# Patient Record
Sex: MALE | Race: OTHER | NOT HISPANIC OR LATINO | URBAN - METROPOLITAN AREA
[De-identification: names, ages, dates, MRNs, and addresses within clinical notes are randomized per-mention and may not be internally consistent; named-entity substitution may affect disease eponyms.]

---

## 2020-09-13 ENCOUNTER — TELEPHONE ENCOUNTER (OUTPATIENT)
Dept: URBAN - METROPOLITAN AREA CLINIC 92 | Facility: CLINIC | Age: 65
End: 2020-09-13

## 2020-09-13 NOTE — HPI-TODAY'S VISIT:
This is a scheduled appointment for this patient, a 65 year old Other Race male, after a previous visit on May 2020, for a telemed follow-up evaluation of Autoimmune Hepatitis. The patient reports no symptoms at the present time. He states no history of new medications or alcohol use. The patient reports a personal history of no other habits that could cause liver damage.      Pt here for a telemed follow up in regards to his aih and cirrhosis.  last year he had ascites and had an u/s in  nov 2019 that showed trace ascites and cirrhosis, splenomegaly no lesion noted.  He spent the holidays in [place] last year.  2-4-20 Dr Melchor did egd: post variceal scar in lower esophagus. 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 single 15mm polyp in gastric antrum with adjacent scar from prior incomplete polypectomy. Removed hot snare. two 4-8mm sessile polyps in gastric bdy, Hot snare.  path: Pathologic Diagnosis STOMACH, POLYPS, POLYPECTOMY: HYPERPLASTIC POLYP (FRAGMENTS OF) WITH INFLAMMATORY CHANGE, SUPERFICIAL EROSION, GRANULATION TISSUE FORMATION AND CHANGES SUGGESTIVE OF MUCOSAL PROLAPSE.   ag Electronically Signed by Aramis Silverio MD, PhD, Pathologist Location: [facility]  Dr Melchor to redo the egd in a year.  That was first banding in a while.  1-28-20 u.s with hepatic cirrhosis and no focal mass seen and splenomegaly seen.  right kidney 12.3cm.  imaged pancreas portions unremarkable. No gallstones. cbd 3mm.  Liver irregular capsule.  Due for the u.s July 28 2020.  Last labs 3-19-20: ast 32 and alt 18 alk 71 and wbc 2.5 hg 10.2 and plat 78. afp 1.8. psa 1.1.  Suspect labs better and eating better and he will redo and do and iron.  12-13-19 inr 1.2 and glu 198 and cr 0.65 and na 137 k 4.3 and cl 104 and co2 29 and ca 8.5 and alb 3.4 and tb 0.9 and alk 77 and ast 26 and alt 16.a1c 6.4%.wbc 2.5 hg 11.5 plat 91.  he has been off diuretics and only little leg edema and he is not on this.  Walking every day abotu 5 km a day.  Prior he has seen Dr. Abrams. he needs to check the iron.  12/13/19 inr 1.2 gluc 198 na 137 cr 0.65 tb 0.9 alp 77 ast 26 alt 16 hemoglobin a1c 6.4% wbc low 2.5 neutrophils 1608 hgb 11.5 plt 91 afp 2.0    Egd 12/3/19  grade 2 varices were banded. single 20mm sessile polyp resection was incomplete due to polypectomy being technically difficult and complex. he is going to repeat an egd next month.  Document Type: US Abdomen Complete Document Date: August 23, 2019 17:33  Document Status: Auth (Verified) Document Title: US Abdomen Complete Performed By: Dequan Koch  Verified By: Dequan Koch on August 23, 2019 17:38  Encounter info: [id number], [facility], Single Visit OP, 8/23/2019 - 8/23/2019   * Final Report *  Reason For Exam Liver dz, HTN  DM   Lower extremity edema  REPORT EXAM: US Abdomen Complete  CLINICAL INDICATION: Liver disorder specified;Liver dz, HTN  DM   Lower extremity edema. DM   HTN  Liver DZ  TECHNIQUE: Axial and longitudinal images were obtained of the upper abdomen using real-time ultrasound. [id number]  COMPARISON: Remote MRI of 11/17/2015  FINDINGS:  Liver: Diffuse cirrhotic changes. No lesions. Portal vein is patent, peak velocity approximately 10 cm/s. Hepatic veins are patent.  Bile Ducts: No dilated intrahepatic biliary radicles. Common bile duct measu2.4 mm.  Gallbladder: Normal. Cheney's sign is negative.  Pancreas: Normal.  Right Kidney: Measures 11.6 cm. Normal echogenicity. No hydronephrosis. No focal lesion  Left Kidney: Measures 10.9 cm. Normal echogenicity. No hydronephrosis. No focal lesion  Spleen: Measures: 16.5 cm. Homogeneous appearance.  Aorta: Not well seen  IVC: Normal proximally, not imaged distally.   Other: Small quantities of scattered simple appearing ascitic fluid.  IMPRESSION:    Redemonstrated cirrhotic changes within the liver. No focal lesion identified. Preserved hepatopedal portal flow. Portal hypertension changes as evidenced by small quantities of ascites and splenomegaly.   Signature Line *** Final ***  Electronically Signed By:  Dequan Koch on  08/23/2019 17:38  Dictated by:  Dequan Koch  Sept 13 2019 chol 169 and hdl 61 and trg 52 and ldl 95, alb 3.5 and globulin 3.9 elevated and tb 2.2 and db 0.5 and ib 1.7 and alk 90 and ast 27 and alt 17,  wbc 3.2 hg 11.8 and plat 92 and crp 5.2.  May 2019 wbc 5 hg 8.0 and plat 144 and inr 1.2. alb 3.5 and tb 0.8 and db 0.2 and alk 65 and ast 26 and alt 15.  afp 1.6 and a1c 6.1%.   When he was low he did get the iron.  Dr. Arias for an umbilical hernia and hemorrhoidectomy which was done on 11/16/18, with re-expolration on 11/24/18 and on 12/11/18 for large rectal varices noted on colonocopy.  Prior to 2019  had been off the diuretics since July 2018.    4/24/19 labs albumin low 3.5 alt 65 ast 26 alt 15 wbc low 3.2 hgb low 7.8 mcv low 74.4 plt low 103 afp 1.6 psa 1 c-reactive protein 5.4 vit d 22 hdl low 39 hemoglobin a1c 6.1%   12/2/18 na 138 k 4.0 cr 0.73 albumin 3.3 alt 17 ast 31 alp 52 wbc low 4 hgb low 8.5 plt low 140 inr 1.48 tb 1.3 MELD 12 Sept 21 2018 a1c 6.4 and alb 4.2 and tb 0.5 and db 0.2 and alk 65 and ast 32 and alt 22 and tp 7.4, chol 160 and trg 83 and hdl 60 and ldl 83. wbc 2.2 hg 8.7 and plat 88 afp 1.9.   U/s: July 2018: Cirrhosis and no ascites and no masses.  Egd May 2018 no varices noted, gastric polyp noted and removed.  He is due for the egd with Dr Melchor. May want Dr Melchor to talk to Dr Arias re flex sig.  Egd 6/4/18 post variceal banding scar found, no varices noted, one cratered gastric ulcer with no stigmata of bleeding found in gastric antrum. duodenum was normal.   He will do the labs for me now as these are from march and we can relook and if anemic and iron DR Abrams to see and we want to see in aug u.s again.   Duration of visit mins with over 50% of the time explaining pt's condition and treatment plan with the pt.

## 2020-09-14 ENCOUNTER — OFFICE VISIT (OUTPATIENT)
Dept: URBAN - METROPOLITAN AREA TELEHEALTH 2 | Facility: TELEHEALTH | Age: 65
End: 2020-09-14
Payer: MEDICARE

## 2020-09-14 DIAGNOSIS — K63.5 COLON POLYP: ICD-10-CM

## 2020-09-14 DIAGNOSIS — I85.10 SECONDARY ESOPHAGEAL VARICES WITHOUT BLEEDING: ICD-10-CM

## 2020-09-14 DIAGNOSIS — K64.9 RECTAL VARICES: ICD-10-CM

## 2020-09-14 DIAGNOSIS — K74.69 OTHER CIRRHOSIS OF LIVER: ICD-10-CM

## 2020-09-14 DIAGNOSIS — K31.7 GASTRIC POLYP: ICD-10-CM

## 2020-09-14 PROCEDURE — G8420 CALC BMI NORM PARAMETERS: HCPCS | Performed by: INTERNAL MEDICINE

## 2020-09-14 PROCEDURE — G9903 PT SCRN TBCO ID AS NON USER: HCPCS | Performed by: INTERNAL MEDICINE

## 2020-09-14 PROCEDURE — 3017F COLORECTAL CA SCREEN DOC REV: CPT | Performed by: INTERNAL MEDICINE

## 2020-09-14 PROCEDURE — 1036F TOBACCO NON-USER: CPT | Performed by: INTERNAL MEDICINE

## 2020-09-14 PROCEDURE — G8427 DOCREV CUR MEDS BY ELIG CLIN: HCPCS | Performed by: INTERNAL MEDICINE

## 2020-09-14 PROCEDURE — 99213 OFFICE O/P EST LOW 20 MIN: CPT | Performed by: INTERNAL MEDICINE

## 2020-09-14 RX ORDER — PANTOPRAZOLE SODIUM 40 MG
TAKE 1 TABLET (40 MG) BY ORAL ROUTE ONCE DAILY TABLET, DELAYED RELEASE (ENTERIC COATED) ORAL 1
Qty: 0 | Refills: 0 | Status: ACTIVE | COMMUNITY
Start: 1900-01-01

## 2020-09-14 RX ORDER — URSODIOL 300 MG/1
TAKE 1 CAPSULE BY ORAL ROUTE TID TAKE WITH FOOD CAPSULE ORAL
Qty: 90 | Refills: 6 | Status: ACTIVE | COMMUNITY
Start: 2017-12-07

## 2020-09-14 RX ORDER — INSULIN GLARGINE 100 [IU]/ML
40 UNITS SQ INJECT BY SUBCUTANEOUS ROUTE 1X INJECTION, SOLUTION SUBCUTANEOUS 1
Qty: 1 | Refills: 0 | Status: ACTIVE | COMMUNITY
Start: 1900-01-01

## 2020-09-14 NOTE — HPI-TODAY'S VISIT:
This is a 65-year-old male who presents today for follow-up.  I last saw him back in January at the time of EGD for cirrhosis and varices surveillance.  At which time he had grade 1 varices which were banded.  He had a few gastric polyps which were resected.  His last colonoscopy was in December 2019 there was a fair prep.  He had a few polyps which were removed.  He is currently in his usual state of health and followed by Dr. Albarran for his liver.

## 2020-09-17 ENCOUNTER — OFFICE VISIT (OUTPATIENT)
Dept: URBAN - METROPOLITAN AREA TELEHEALTH 2 | Facility: TELEHEALTH | Age: 65
End: 2020-09-17

## 2020-09-17 ENCOUNTER — OFFICE VISIT (OUTPATIENT)
Dept: URBAN - METROPOLITAN AREA CLINIC 91 | Facility: CLINIC | Age: 65
End: 2020-09-17
Payer: MEDICARE

## 2020-09-17 ENCOUNTER — TELEPHONE ENCOUNTER (OUTPATIENT)
Dept: URBAN - METROPOLITAN AREA CLINIC 92 | Facility: CLINIC | Age: 65
End: 2020-09-17

## 2020-09-17 DIAGNOSIS — R16.1 SPLENOMEGALY: ICD-10-CM

## 2020-09-17 DIAGNOSIS — K74.69 OTHER CIRRHOSIS OF LIVER: ICD-10-CM

## 2020-09-17 DIAGNOSIS — R18.8 ASCITES: ICD-10-CM

## 2020-09-17 PROCEDURE — 76705 ECHO EXAM OF ABDOMEN: CPT

## 2020-09-17 PROCEDURE — 93975 VASCULAR STUDY: CPT

## 2020-09-17 RX ORDER — INSULIN GLARGINE 100 [IU]/ML
40 UNITS SQ INJECT BY SUBCUTANEOUS ROUTE 1X INJECTION, SOLUTION SUBCUTANEOUS 1
Qty: 1 | Refills: 0 | Status: ACTIVE | COMMUNITY
Start: 1900-01-01

## 2020-09-17 RX ORDER — URSODIOL 300 MG/1
TAKE 1 CAPSULE BY ORAL ROUTE TID TAKE WITH FOOD CAPSULE ORAL
Qty: 90 | Refills: 6 | Status: ACTIVE | COMMUNITY
Start: 2017-12-07

## 2020-09-17 RX ORDER — PANTOPRAZOLE SODIUM 40 MG
TAKE 1 TABLET (40 MG) BY ORAL ROUTE ONCE DAILY TABLET, DELAYED RELEASE (ENTERIC COATED) ORAL 1
Qty: 0 | Refills: 0 | Status: ACTIVE | COMMUNITY
Start: 1900-01-01

## 2020-09-17 NOTE — HPI-TODAY'S VISIT:
Dear  Dr Marte, U.s: they see signs of cirrhosis, with signs of portal hypertension. Small volume ascites and splenomegaly.  No suspicious liver lesions. Patent vessels. Spleen 16cm enlarged.  Right kidney 11.6cm. Pancreas normal. No stones in gallbladder. No liver lesions.  Dr Albarran

## 2020-09-20 ENCOUNTER — TELEPHONE ENCOUNTER (OUTPATIENT)
Dept: URBAN - METROPOLITAN AREA CLINIC 92 | Facility: CLINIC | Age: 65
End: 2020-09-20

## 2020-09-20 NOTE — HPI-TODAY'S VISIT:
Dr Marte,  Here are local labs reviewed ythat you sent in:  Sept labs: alb 3.3 and tb 0.8 and db 0.2 and alk 79 and ast 35 and alt 21 and ideal <35.  wbc 2.4 littl elow abd hg low 9.9 and mcv 85.8.  Platelts clumped. crp 2.4. afp normal 2.2 and psa 1.0. a1c 6.9%.   See you soon.  Dr Albarran

## 2020-09-22 ENCOUNTER — OFFICE VISIT (OUTPATIENT)
Dept: URBAN - METROPOLITAN AREA TELEHEALTH 2 | Facility: TELEHEALTH | Age: 65
End: 2020-09-22
Payer: MEDICARE

## 2020-09-22 DIAGNOSIS — K75.4 AUTOIMMUNE HEPATITIS: ICD-10-CM

## 2020-09-22 DIAGNOSIS — E80.4 GILBERT SYNDROME: ICD-10-CM

## 2020-09-22 DIAGNOSIS — Z71.89 VACCINE COUNSELING: ICD-10-CM

## 2020-09-22 DIAGNOSIS — D69.6 THROMBOCYTOPENIA: ICD-10-CM

## 2020-09-22 DIAGNOSIS — I85.10 SECONDARY ESOPHAGEAL VARICES WITHOUT BLEEDING: ICD-10-CM

## 2020-09-22 DIAGNOSIS — E66.3 OVERWEIGHT: ICD-10-CM

## 2020-09-22 DIAGNOSIS — R18.8 OTHER ASCITES: ICD-10-CM

## 2020-09-22 DIAGNOSIS — R16.1 SPLENOMEGALY: ICD-10-CM

## 2020-09-22 DIAGNOSIS — E11.9 DIABETES: ICD-10-CM

## 2020-09-22 PROCEDURE — 1036F TOBACCO NON-USER: CPT

## 2020-09-22 PROCEDURE — G8427 DOCREV CUR MEDS BY ELIG CLIN: HCPCS

## 2020-09-22 PROCEDURE — 3017F COLORECTAL CA SCREEN DOC REV: CPT

## 2020-09-22 PROCEDURE — 99214 OFFICE O/P EST MOD 30 MIN: CPT

## 2020-09-22 PROCEDURE — G8417 CALC BMI ABV UP PARAM F/U: HCPCS

## 2020-09-22 PROCEDURE — G9903 PT SCRN TBCO ID AS NON USER: HCPCS

## 2020-09-22 RX ORDER — URSODIOL 300 MG/1
TAKE 1 CAPSULE BY ORAL ROUTE TID TAKE WITH FOOD CAPSULE ORAL
Qty: 30 | Refills: 4

## 2020-09-22 RX ORDER — FUROSEMIDE 20 MG/1
1 TABLET TABLET ORAL TIW
Qty: 10 | Refills: 2 | OUTPATIENT

## 2020-09-22 RX ORDER — URSODIOL 300 MG/1
TAKE 1 CAPSULE BY ORAL ROUTE TID TAKE WITH FOOD CAPSULE ORAL
Qty: 90 | Refills: 6 | Status: ACTIVE | COMMUNITY
Start: 2017-12-07

## 2020-09-22 RX ORDER — INSULIN GLARGINE 100 [IU]/ML
40 UNITS SQ INJECT BY SUBCUTANEOUS ROUTE 1X INJECTION, SOLUTION SUBCUTANEOUS 1
Qty: 1 | Refills: 0 | Status: ACTIVE | COMMUNITY
Start: 1900-01-01

## 2020-09-22 RX ORDER — PANTOPRAZOLE SODIUM 40 MG
TAKE 1 TABLET (40 MG) BY ORAL ROUTE ONCE DAILY TABLET, DELAYED RELEASE (ENTERIC COATED) ORAL 1
Qty: 0 | Refills: 0 | Status: ACTIVE | COMMUNITY
Start: 1900-01-01

## 2020-09-22 NOTE — HPI-TODAY'S VISIT:
This is a scheduled appointment for this patient, a 65 year old Other Race male, after a previous visit on May 2020, for a telemed follow-up evaluation of Autoimmune Hepatitis.   The patient reports no symptoms at the present time. He states no history of new medications or alcohol use. The patient reports a personal history of no other habits that could cause liver damage.    Pt here for a telemed follow up in regards to his aih and cirrhosis.    2019 he had ascites and had an u/s in 2019 that showed trace ascites and cirrhosis, splenomegaly no lesion noted.  Not able to travel due to covid 19 and he has not been able to travel.  Sept labs: alb 3.3 and tb 0.8 and db 0.2 and alk 79 and ast 35 and alt 21 and ideal  less than 35.  wbc 2.4 little low and hg low 9.9 and mcv 85.8.  Platelts clumped. crp 2.4. afp normal 2.2 and psa 1.0. a1c 6.9%.   May want to redo the sample and do the iron and he says he redid it and pending and he suspects that it is real. Hg was better 10.7 so little low.   2020: U.s: they see signs of cirrhosis, with signs of portal hypertension. Small volume ascites and splenomegaly.  No suspicious liver lesions. Patent vessels. Spleen 16cm enlarged.  Right kidney 11.6cm. Pancreas normal. No stones in gallbladder. No liver lesions.   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 single 15mm polyp in gastric antrum with adjacent scar from prior incomplete polypectomy. Removed hot snare. two 4-8mm sessile polyps in gastric bdy, Hot snare.  he has not had one since and to do Dec 16 2020 with Dr Melchor and he will do colon also.  His last colonoscopy was in 2019 there was a fair prep.  He had a few polyps which were removed.    path: Pathologic Diagnosis STOMACH, POLYPS, POLYPECTOMY: HYPERPLASTIC POLYP (FRAGMENTS OF) WITH INFLAMMATORY CHANGE, SUPERFICIAL EROSION, GRANULATION TISSUE FORMATION AND CHANGES SUGGESTIVE OF MUCOSAL PROLAPSE.   ag Electronically Signed by Aramis Silverio MD, PhD, Pathologist Location: [facility]  20 u.s with hepatic cirrhosis and no focal mass seen and splenomegaly seen.  right kidney 12.3cm.  imaged pancreas portions unremarkable. No gallstones. cbd 3mm.  Liver irregular capsule.   3-19-20: ast 32 and alt 18 alk 71 and wbc 2.5 hg 10.2 and plat 78. afp 1.8. psa 1.1.  19 inr 1.2 and glu 198 and cr 0.65 and na 137 k 4.3 and cl 104 and co2 29 and ca 8.5 and alb 3.4 and tb 0.9 and alk 77 and ast 26 and alt 16.a1c 6.4%.wbc 2.5 hg 11.5 plat 91.  Walking every day about 5 km a day approx.  Prior he has seen Dr. Abrams. may want to check iron and do that and if low do the iron.  19 inr 1.2 gluc 198 na 137 cr 0.65 tb 0.9 alp 77 ast 26 alt 16 hemoglobin a1c 6.4% wbc low 2.5 neutrophils 1608 hgb 11.5 plt 91 afp 2.0    Egd 12/3/19  grade 2 varices were banded. single 20mm sessile polyp resection was incomplete due to polypectomy being technically difficult and complex.   Document Type: US Abdomen Complete Document Date: 2019 17:33  Document Status: Auth (Verified) Document Title: US Abdomen Complete Performed By: Dequan Koch  Verified By: Dequan Koch on 2019 17:38  Encounter info: [id number], [facility], Single Visit OP, 2019 - 2019   * Final Report *  Reason For Exam Liver dz, HTN  DM   Lower extremity edema  REPORT EXAM: US Abdomen Complete  CLINICAL INDICATION: Liver disorder specified;Liver dz, HTN  DM   Lower extremity edema. DM   HTN  Liver DZ  TECHNIQUE: Axial and longitudinal images were obtained of the upper abdomen using real-time ultrasound. [id number]  COMPARISON: Remote MRI of 2015  FINDINGS:  Liver: Diffuse cirrhotic changes. No lesions. Portal vein is patent, peak velocity approximately 10 cm/s. Hepatic veins are patent.  Bile Ducts: No dilated intrahepatic biliary radicles. Common bile duct measu2.4 mm.  Gallbladder: Normal. Cheney's sign is negative.  Pancreas: Normal.  Right Kidney: Measures 11.6 cm. Normal echogenicity. No hydronephrosis. No focal lesion  Left Kidney: Measures 10.9 cm. Normal echogenicity. No hydronephrosis. No focal lesion  Spleen: Measures: 16.5 cm. Homogeneous appearance.  Aorta: Not well seen  IVC: Normal proximally, not imaged distally.   Other: Small quantities of scattered simple appearing ascitic fluid.  IMPRESSION:    Redemonstrated cirrhotic changes within the liver. No focal lesion identified. Preserved hepatopedal portal flow. Portal hypertension changes as evidenced by small quantities of ascites and splenomegaly.   Signature Line *** Final ***  Electronically Signed By:  Dequan Koch on  2019 17:38  Dictated by:  Dequan Koch  2019 chol 169 and hdl 61 and trg 52 and ldl 95, alb 3.5 and globulin 3.9 elevated and tb 2.2 and db 0.5 and ib 1.7 and alk 90 and ast 27 and alt 17,  wbc 3.2 hg 11.8 and plat 92 and crp 5.2.  May 2019 wbc 5 hg 8.0 and plat 144 and inr 1.2. alb 3.5 and tb 0.8 and db 0.2 and alk 65 and ast 26 and alt 15.  afp 1.6 and a1c 6.1%.   When he was low he did get the iron back then.  Dr. Arias for an umbilical hernia and hemorrhoidectomy which was done on 18, with re-expolration on 18 and on 18 for large rectal varices noted on colonocopy.  Prior to   had been off the diuretics since 2018.    19 labs albumin low 3.5 alt 65 ast 26 alt 15 wbc low 3.2 hgb low 7.8 mcv low 74.4 plt low 103 afp 1.6 psa 1 c-reactive protein 5.4 vit d 22 hdl low 39 hemoglobin a1c 6.1%   18 na 138 k 4.0 cr 0.73 albumin 3.3 alt 17 ast 31 alp 52 wbc low 4 hgb low 8.5 plt low 140 inr 1.48 tb 1.3 MELD  a1c 6.4 and alb 4.2 and tb 0.5 and db 0.2 and alk 65 and ast 32 and alt 22 and tp 7.4, chol 160 and trg 83 and hdl 60 and ldl 83. wbc 2.2 hg 8.7 and plat 88 afp 1.9.   U/s: 2018: Cirrhosis and no ascites and no masses.  Egd May 2018 no varices noted, gastric polyp noted and removed.  He is due for the egd with Dr Melchor. May want Dr Melchor to talk to Dr Arias re flex sig.  Egd 18 post variceal banding scar found, no varices noted, one cratered gastric ulcer with no stigmata of bleeding found in gastric antrum. duodenum was normal.  Plan: 1, Do lasix 20mg on mwf.Did not do well with aldactone. 2. Plan u.s to check ascites in dec. 3. Pt will do labs in 4 and 12 weeks. 4. Do the scopes in early dec 16 and see us after the scopes.   Stressed to pt the need for social distancing and strict handwashing and wearing a mask and to follow any other new or added CDC recommendations as this is an evolving target.   Duration of visit 25  mins via healow with over 50% of the time explaining pt's condition and treatment plan with the pt.   More than half of the face-to-face time used for counseling and coordination of care.  Patient seen today via telehealth by agreement and consent of patient in light of current COVID-19 pandemic. I used video conferencing during the visit. The patient encounter is appropriate and reasonable under the circumstances given the patient's particular presentation at this time. The patient has been advised of the followin) the potential risks and limitations of this mode of treatment (including but not limited to the absence of in-person examination); 2) the right to refuse telehealth services at any point without affecting the right to future care; 3) the right to receive in-person services, included immediately after this consultation if an urgent need arises; 4) information, including identifiable images or information from this telehealth consult, will only be shared in accordance with HIPAA regulations. Any and all of the patient's and/or patient's family member's questions on this issue have been answered. The patient has verbally consented to be treated via telehealth services. The patient has also been advised to contact this office for worsening conditions or problems, and seek emergency medical treatment and/or call 911 if the patient deems either necessary.

## 2020-10-12 ENCOUNTER — LAB OUTSIDE AN ENCOUNTER (OUTPATIENT)
Dept: URBAN - METROPOLITAN AREA TELEHEALTH 2 | Facility: TELEHEALTH | Age: 65
End: 2020-10-12

## 2020-12-12 ENCOUNTER — LAB OUTSIDE AN ENCOUNTER (OUTPATIENT)
Dept: URBAN - METROPOLITAN AREA TELEHEALTH 2 | Facility: TELEHEALTH | Age: 65
End: 2020-12-12

## 2020-12-15 ENCOUNTER — LAB OUTSIDE AN ENCOUNTER (OUTPATIENT)
Dept: URBAN - METROPOLITAN AREA TELEHEALTH 2 | Facility: TELEHEALTH | Age: 65
End: 2020-12-15

## 2020-12-15 ENCOUNTER — OFFICE VISIT (OUTPATIENT)
Dept: URBAN - METROPOLITAN AREA CLINIC 91 | Facility: CLINIC | Age: 65
End: 2020-12-15

## 2020-12-16 ENCOUNTER — OFFICE VISIT (OUTPATIENT)
Dept: URBAN - METROPOLITAN AREA MEDICAL CENTER 12 | Facility: MEDICAL CENTER | Age: 65
End: 2020-12-16

## 2020-12-29 ENCOUNTER — OFFICE VISIT (OUTPATIENT)
Dept: URBAN - METROPOLITAN AREA CLINIC 91 | Facility: CLINIC | Age: 65
End: 2020-12-29
Payer: MEDICARE

## 2020-12-29 DIAGNOSIS — R18.8 ASCITES: ICD-10-CM

## 2020-12-29 PROCEDURE — 93975 VASCULAR STUDY: CPT

## 2020-12-29 PROCEDURE — 76705 ECHO EXAM OF ABDOMEN: CPT

## 2020-12-29 RX ORDER — URSODIOL 300 MG/1
TAKE 1 CAPSULE BY ORAL ROUTE TID TAKE WITH FOOD CAPSULE ORAL
Qty: 30 | Refills: 4 | Status: ACTIVE | COMMUNITY

## 2020-12-29 RX ORDER — PANTOPRAZOLE SODIUM 40 MG
TAKE 1 TABLET (40 MG) BY ORAL ROUTE ONCE DAILY TABLET, DELAYED RELEASE (ENTERIC COATED) ORAL 1
Qty: 0 | Refills: 0 | Status: ACTIVE | COMMUNITY
Start: 1900-01-01

## 2020-12-29 RX ORDER — INSULIN GLARGINE 100 [IU]/ML
40 UNITS SQ INJECT BY SUBCUTANEOUS ROUTE 1X INJECTION, SOLUTION SUBCUTANEOUS 1
Qty: 1 | Refills: 0 | Status: ACTIVE | COMMUNITY
Start: 1900-01-01

## 2020-12-29 RX ORDER — FUROSEMIDE 20 MG/1
1 TABLET TABLET ORAL TIW
Qty: 10 | Refills: 2 | Status: ACTIVE | COMMUNITY

## 2020-12-30 ENCOUNTER — TELEPHONE ENCOUNTER (OUTPATIENT)
Dept: URBAN - METROPOLITAN AREA CLINIC 92 | Facility: CLINIC | Age: 65
End: 2020-12-30

## 2020-12-30 ENCOUNTER — LAB OUTSIDE AN ENCOUNTER (OUTPATIENT)
Dept: URBAN - METROPOLITAN AREA CLINIC 92 | Facility: CLINIC | Age: 65
End: 2020-12-30

## 2020-12-30 PROBLEM — 300331000 LESION OF LIVER: Status: ACTIVE | Noted: 2020-12-30

## 2020-12-30 NOTE — HPI-TODAY'S VISIT:
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 Small 5x8x6 mm lesion seen in right lobe and indeterminate. They recommend mri.   I looked back and this spot was not seen in the last few scans.   We will set up mri. Spoke with the pt.  Liver patent vessels.  Spleen enlarged. Trace ascites. Spleen 16cm.  Right kidney 11.1cm. Pancreas normal. Common bile duct 3.4mm.   Dr Albarran

## 2021-01-20 ENCOUNTER — TELEPHONE ENCOUNTER (OUTPATIENT)
Dept: URBAN - METROPOLITAN AREA CLINIC 92 | Facility: CLINIC | Age: 66
End: 2021-01-20

## 2021-01-20 NOTE — HPI-OTHER HISTORIES
Dear Dr Marte,  Jan 20 mri: Morphologic changes of chronic liver disease and stigmata of portal hypertension with abdominal varices and splenomegaly. No HCC seen. No liver fat or iron. Morphologic changes of chronic liver disease including nodular contour, lobar redistribution, fissural widening, and diffuse background of delayed reticular enhancement compatible with fibrosis. Portal vein is patent. Spleen is enlarged measuring up to 17.8. Pancreas and kidneys normal. Small perigastric and perisplenic varices are present. Small volume perihepatic ascites.  Dr Albarran

## 2021-01-27 ENCOUNTER — OFFICE VISIT (OUTPATIENT)
Dept: URBAN - METROPOLITAN AREA MEDICAL CENTER 12 | Facility: MEDICAL CENTER | Age: 66
End: 2021-01-27
Payer: MEDICARE

## 2021-01-27 DIAGNOSIS — Z86.010 H/O ADENOMATOUS POLYP OF COLON: ICD-10-CM

## 2021-01-27 DIAGNOSIS — D12.3 ADENOMA OF TRANSVERSE COLON: ICD-10-CM

## 2021-01-27 DIAGNOSIS — K31.7 BENIGN GASTRIC POLYP: ICD-10-CM

## 2021-01-27 DIAGNOSIS — D12.4 ADENOMA OF DESCENDING COLON: ICD-10-CM

## 2021-01-27 PROCEDURE — 43239 EGD BIOPSY SINGLE/MULTIPLE: CPT | Performed by: INTERNAL MEDICINE

## 2021-01-27 PROCEDURE — 45380 COLONOSCOPY AND BIOPSY: CPT | Performed by: INTERNAL MEDICINE

## 2021-02-04 ENCOUNTER — TELEPHONE ENCOUNTER (OUTPATIENT)
Dept: URBAN - METROPOLITAN AREA CLINIC 92 | Facility: CLINIC | Age: 66
End: 2021-02-04

## 2021-02-04 NOTE — HPI-TODAY'S VISIT:
To: [facility]        Appeals Documents requests        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  He recently on 12- did his surveillance u.s and a new small lesion was seen in the right lobe [id number] and this was again not seen prior over serial scans at the same imaging center, Radiology team saw this and due to this being indeterminate the recommended mri to be done to assess this.  The mri was ordered and I am not sure why this was done without approval as we are usually notified by [place] if there is any issues to initiate any peer to peer review but regardless the mri was done.  We are writing this letter to appeal as this pt clearly had a new finding by scan and radiology recommended an mri to assesss this as the next best step for this pt. We agreed that it was medically necessary as it was also concerning as a new finding for possible malignancy in this pt with a hx of long standing cirrhosis and phtn.  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 I am not sure if he did or did not realize at the time that the case had been denied when that was done.  [place] reconsider the denial of the mri and reconsider it for approval given this information.  I have attached the radiology report for your review.  Dr Kaila Albarran MD [facility]

## 2021-11-08 ENCOUNTER — TELEPHONE ENCOUNTER (OUTPATIENT)
Dept: URBAN - METROPOLITAN AREA CLINIC 92 | Facility: CLINIC | Age: 66
End: 2021-11-08

## 2021-11-29 ENCOUNTER — LAB OUTSIDE AN ENCOUNTER (OUTPATIENT)
Dept: URBAN - METROPOLITAN AREA CLINIC 92 | Facility: CLINIC | Age: 66
End: 2021-11-29

## 2021-12-03 ENCOUNTER — OFFICE VISIT (OUTPATIENT)
Dept: URBAN - METROPOLITAN AREA CLINIC 91 | Facility: CLINIC | Age: 66
End: 2021-12-03
Payer: MEDICARE

## 2021-12-03 ENCOUNTER — LAB OUTSIDE AN ENCOUNTER (OUTPATIENT)
Dept: URBAN - METROPOLITAN AREA CLINIC 92 | Facility: CLINIC | Age: 66
End: 2021-12-03

## 2021-12-03 DIAGNOSIS — K74.60 CIRRHOSIS: ICD-10-CM

## 2021-12-03 DIAGNOSIS — K75.9 HEPATITIS: ICD-10-CM

## 2021-12-03 PROCEDURE — 76700 US EXAM ABDOM COMPLETE: CPT

## 2021-12-03 RX ORDER — URSODIOL 300 MG/1
TAKE 1 CAPSULE BY ORAL ROUTE TID TAKE WITH FOOD CAPSULE ORAL
Qty: 30 | Refills: 4 | Status: ACTIVE | COMMUNITY

## 2021-12-03 RX ORDER — FUROSEMIDE 20 MG/1
1 TABLET TABLET ORAL TIW
Qty: 10 | Refills: 2 | Status: ACTIVE | COMMUNITY

## 2021-12-03 RX ORDER — PANTOPRAZOLE SODIUM 40 MG
TAKE 1 TABLET (40 MG) BY ORAL ROUTE ONCE DAILY TABLET, DELAYED RELEASE (ENTERIC COATED) ORAL 1
Qty: 0 | Refills: 0 | Status: ACTIVE | COMMUNITY
Start: 1900-01-01

## 2021-12-03 RX ORDER — INSULIN GLARGINE 100 [IU]/ML
40 UNITS SQ INJECT BY SUBCUTANEOUS ROUTE 1X INJECTION, SOLUTION SUBCUTANEOUS 1
Qty: 1 | Refills: 0 | Status: ACTIVE | COMMUNITY
Start: 1900-01-01

## 2021-12-13 ENCOUNTER — OFFICE VISIT (OUTPATIENT)
Dept: URBAN - METROPOLITAN AREA CLINIC 86 | Facility: CLINIC | Age: 66
End: 2021-12-13
Payer: MEDICARE

## 2021-12-13 ENCOUNTER — LAB OUTSIDE AN ENCOUNTER (OUTPATIENT)
Dept: URBAN - METROPOLITAN AREA CLINIC 86 | Facility: CLINIC | Age: 66
End: 2021-12-13

## 2021-12-13 VITALS
SYSTOLIC BLOOD PRESSURE: 134 MMHG | BODY MASS INDEX: 27.11 KG/M2 | TEMPERATURE: 97.7 F | WEIGHT: 183 LBS | HEART RATE: 73 BPM | DIASTOLIC BLOOD PRESSURE: 62 MMHG | HEIGHT: 69 IN

## 2021-12-13 DIAGNOSIS — Z79.899 HIGH RISK MEDICATION USE: ICD-10-CM

## 2021-12-13 DIAGNOSIS — K75.4 AUTOIMMUNE HEPATITIS: ICD-10-CM

## 2021-12-13 DIAGNOSIS — D69.6 THROMBOCYTOPENIA: ICD-10-CM

## 2021-12-13 DIAGNOSIS — R18.8 OTHER ASCITES: ICD-10-CM

## 2021-12-13 PROCEDURE — 99215 OFFICE O/P EST HI 40 MIN: CPT

## 2021-12-13 RX ORDER — PANTOPRAZOLE SODIUM 40 MG
TAKE 1 TABLET (40 MG) BY ORAL ROUTE ONCE DAILY TABLET, DELAYED RELEASE (ENTERIC COATED) ORAL 1
Qty: 0 | Refills: 0 | Status: ACTIVE | COMMUNITY
Start: 1900-01-01

## 2021-12-13 RX ORDER — FUROSEMIDE 20 MG/1
1 TABLET TABLET ORAL TIW
Qty: 12 | Refills: 1 | OUTPATIENT

## 2021-12-13 RX ORDER — URSODIOL 300 MG/1
TAKE 1 CAPSULE BY ORAL ROUTE TID TAKE WITH FOOD CAPSULE ORAL
Qty: 30 | Refills: 4 | Status: ACTIVE | COMMUNITY

## 2021-12-13 RX ORDER — INSULIN GLARGINE 100 [IU]/ML
30 UNITS SQ INJECT BY SUBCUTANEOUS ROUTE 1X INJECTION, SOLUTION SUBCUTANEOUS 1
Refills: 0 | Status: ACTIVE | COMMUNITY
Start: 1900-01-01

## 2021-12-13 RX ORDER — URSODIOL 300 MG/1
TAKE 1 CAPSULE BY ORAL ROUTE TID TAKE WITH FOOD CAPSULE ORAL
Qty: 90 | Refills: 1

## 2021-12-13 RX ORDER — FUROSEMIDE 20 MG/1
1 TABLET TABLET ORAL
Refills: 2 | Status: ACTIVE | COMMUNITY

## 2021-12-13 NOTE — EXAM-PHYSICAL EXAM
Gen: awake and responsive. Eyes: anicteric, normal lids. Mouth: covered with mask. Nose: covered with mask. Hearing: intact grossly. Neck: trachea midline and no jvd. CV: RRR no s3. Lungs: clear. No wheezes, Abd: Soft, nabs, NR, NT. Mild fluid wave and cannot feel liver or spleen. Ext: trace sig edema, some palm erythema. Neuro: moves all 4 ext grossly. No asterixis. Skin: no pruritis and some palm erythema.

## 2021-12-13 NOTE — HPI-TODAY'S VISIT:
This is a scheduled appointment for this patient, a 66 year old Other Race male, after a previous visit on sept 2020, for a telemed follow-up evaluation of Autoimmune Hepatitis.   Pt comes in to be seen and he has fluid build up. He is having trouble eating.  He has no weight gain and he is at 183 stable.  He is on water pills and he is fursomide 1x and now 2x week. He is not on spironolactione. Could try some amiloride. He has breast tenderness and vomiting.  He is on a low salt diet.  In 2019 he had ascites and had an u/s in nov 2019 that showed trace ascites and cirrhosis, splenomegaly no lesion noted.  December 3, 2021 ultrasound shows the liver to be increased/coarsened echotexture and nodular contour compatible with cirrhosis. There is a 9 x 6 x 10 mm subcapsular hypoechoic focus seen along the medial margin of the liver that they favored to represent trace perihepatic fluid. Previously measured hyperechoic focus in the liver was not confidently seen. No bile duct dilation seen and common bile duct 4.8 mm. Gallbladder unremarkable. Right kidney 10.9 cm and left kidney 10.9 cm. No hydronephrosis. Spleen enlarged at 15.7 cm. Moderate to large volume perihepatic ascites seen. May want to consider MRI to further assess the fluid issues.  Dec 10 2021 chol 151 and hdl 48 and trg 100 and ldl 83,  IGG 2414 and  and alb 3.0 and tb 1.3 and db 0.3 and alk 72 and ast 36 and alt 16.   Nov 2 2021 fell down 16 flights and he went to work, Was slippery and gell alone. Had ecchymosis. IGG 2169 and igm 286. na 142 and k 4.2 and cl 108 and co2 28 and wbc 3.1 hg 10.8 and plat 114. mcv 86.2. afp 2.5 and psa 0.71.a1c 5.7%. iron 30%.  January 27 2021 colonoscopy with Dr. Melchor notes a diminutive polyp in the descending colon there was removed with cold forceps a diminutive polyp in the transverse colon that was removed with cold forcep scattered diverticuli seen in the left colon nonbleeding rectal varices and prior hemorrhoid surgery scar was seen. January 27 2021 EGD showed post variceal banding scar lower third esophagus no appreciable ascites single greater than 50 mm sessile polyps in the gastric antrum and biopsies were taken for histology. A repeat EGD was recommended for 1 year. Gastric biopsy came back inflammatory type polyp with foveolar hyperplasia and granulation tissue. The transverse colon and ascending colon polyps were tubular adenomas.  Jan 20 mri: Morphologic changes of chronic liver disease and stigmata of portal hypertension with abdominal varices and splenomegaly. No HCC seen. No liver fat or iron. Morphologic changes of chronic liver disease including nodular contour, lobar redistribution, fissural widening, and diffuse background of delayed reticular enhancement compatible with fibrosis. Portal vein is patent. Spleen is enlarged measuring up to 17.8. Pancreas and kidneys normal. Small perigastric and perisplenic varices are present. Small volume perihepatic ascites.  Given the gall and ascites worse post that need the mri.  Dec 2020 u.s: cirrhotic appearing liver. Small 5x8x6 mm lesion seen in right lobe and indeterminate. They recommend mri.  Liver patent vessels.  Spleen enlarged. Trace ascites. Spleen 16cm.  Right kidney 11.1cm. Pancreas normal. Common bile duct 3.4mm.   Sept 20202 labs: alb 3.3 and tb 0.8 and db 0.2 and alk 79 and ast 35 and alt 21 and ideal  less than 35.  wbc 2.4 little low and hg low 9.9 and mcv 85.8.  Platelts clumped. crp 2.4. afp normal 2.2 and psa 1.0. a1c 6.9%.   Sept 2020: U.s: they see signs of cirrhosis, with signs of portal hypertension. Small volume ascites and splenomegaly.  No suspicious liver lesions. Patent vessels. Spleen 16cm enlarged.  Right kidney 11.6cm. Pancreas normal. No stones in gallbladder. No liver lesions.   2-4-20 Dr Melchro did egd: post variceal scar in lower esophagus. 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 single 15mm polyp in gastric antrum with adjacent scar from prior incomplete polypectomy. Removed hot snare. two 4-8mm sessile polyps in gastric bdy, Hot snare.  he has not had one since and to do Dec 16 2020 with Dr Melchor and he will do colon also.  His last colonoscopy was in December 2019 there was a fair prep.  He had a few polyps which were removed.    path: Pathologic Diagnosis STOMACH, POLYPS, POLYPECTOMY: HYPERPLASTIC POLYP (FRAGMENTS OF) WITH INFLAMMATORY CHANGE, SUPERFICIAL EROSION, GRANULATION TISSUE FORMATION AND CHANGES SUGGESTIVE OF MUCOSAL PROLAPSE.   ag Electronically Signed by Aramis Silverio MD, PhD, Pathologist Location: [facility]  1-28-20 u.s with hepatic cirrhosis and no focal mass seen and splenomegaly seen.  right kidney 12.3cm.  imaged pancreas portions unremarkable. No gallstones. cbd 3mm.  Liver irregular capsule.   3-19-20: ast 32 and alt 18 alk 71 and wbc 2.5 hg 10.2 and plat 78. afp 1.8. psa 1.1.  12-13-19 inr 1.2 and glu 198 and cr 0.65 and na 137 k 4.3 and cl 104 and co2 29 and ca 8.5 and alb 3.4 and tb 0.9 and alk 77 and ast 26 and alt 16.a1c 6.4%.wbc 2.5 hg 11.5 plat 91.  Walking every day still.  Prior he has seen Dr. Abrams. may want to check iron and do that and if low do the iron.  12/13/19 inr 1.2 gluc 198 na 137 cr 0.65 tb 0.9 alp 77 ast 26 alt 16 hemoglobin a1c 6.4% wbc low 2.5 neutrophils 1608 hgb 11.5 plt 91 afp 2.0    Egd 12/3/19  grade 2 varices were banded. single 20mm sessile polyp resection was incomplete due to polypectomy being technically difficult and complex.   Document Type: US Abdomen Complete Document Date: August 23, 2019 17:33  Document Status: Auth (Verified) Document Title: US Abdomen Complete Performed By: Dequan Koch  Verified By: Dequan Koch on August 23, 2019 17:38  Encounter info: [id number], [facility], Single Visit OP, 8/23/2019 - 8/23/2019   * Final Report *  Reason For Exam Liver dz, HTN  DM   Lower extremity edema  REPORT EXAM: US Abdomen Complete  CLINICAL INDICATION: Liver disorder specified;Liver dz, HTN  DM   Lower extremity edema. DM   HTN  Liver DZ  TECHNIQUE: Axial and longitudinal images were obtained of the upper abdomen using real-time ultrasound. [id number]  COMPARISON: Remote MRI of 11/17/2015  FINDINGS:  Liver: Diffuse cirrhotic changes. No lesions. Portal vein is patent, peak velocity approximately 10 cm/s. Hepatic veins are patent.  Bile Ducts: No dilated intrahepatic biliary radicles. Common bile duct measu2.4 mm.  Gallbladder: Normal. Cheney's sign is negative.  Pancreas: Normal.  Right Kidney: Measures 11.6 cm. Normal echogenicity. No hydronephrosis. No focal lesion  Left Kidney: Measures 10.9 cm. Normal echogenicity. No hydronephrosis. No focal lesion  Spleen: Measures: 16.5 cm. Homogeneous appearance.  Aorta: Not well seen  IVC: Normal proximally, not imaged distally.   Other: Small quantities of scattered simple appearing ascitic fluid.  IMPRESSION:    Redemonstrated cirrhotic changes within the liver. No focal lesion identified. Preserved hepatopedal portal flow. Portal hypertension changes as evidenced by small quantities of ascites and splenomegaly.   Signature Line *** Final ***  Electronically Signed By:  Dequan Koch on  08/23/2019 17:38  Dictated by:  Dequan Koch  Sept 13 2019 chol 169 and hdl 61 and trg 52 and ldl 95, alb 3.5 and globulin 3.9 elevated and tb 2.2 and db 0.5 and ib 1.7 and alk 90 and ast 27 and alt 17,  wbc 3.2 hg 11.8 and plat 92 and crp 5.2.  May 2019 wbc 5 hg 8.0 and plat 144 and inr 1.2. alb 3.5 and tb 0.8 and db 0.2 and alk 65 and ast 26 and alt 15.  afp 1.6 and a1c 6.1%.   When he was low he did get the iron back then.  Dr. Arias for an umbilical hernia and hemorrhoidectomy which was done on 11/16/18, with re-expolration on 11/24/18 and on 12/11/18 for large rectal varices noted on colonocopy.  Prior to 2019  had been off the diuretics since July 2018.    4/24/19 labs albumin low 3.5 alt 65 ast 26 alt 15 wbc low 3.2 hgb low 7.8 mcv low 74.4 plt low 103 afp 1.6 psa 1 c-reactive protein 5.4 vit d 22 hdl low 39 hemoglobin a1c 6.1%   12/2/18 na 138 k 4.0 cr 0.73 albumin 3.3 alt 17 ast 31 alp 52 wbc low 4 hgb low 8.5 plt low 140 inr 1.48 tb 1.3 MELD 12 Sept 21 2018 a1c 6.4 and alb 4.2 and tb 0.5 and db 0.2 and alk 65 and ast 32 and alt 22 and tp 7.4, chol 160 and trg 83 and hdl 60 and ldl 83. wbc 2.2 hg 8.7 and plat 88 afp 1.9.   U/s: July 2018: Cirrhosis and no ascites and no masses.  Egd May 2018 no varices noted, gastric polyp noted and removed.  Egd 6/4/18 post variceal banding scar found, no varices noted, one cratered gastric ulcer with no stigmata of bleeding found in gastric antrum. duodenum was normal.  Plan: 1, Do lasix 20mg on mwf. Not as optimal for ascites but better on that dose. 2. He does not do well with aldactone. 3. Plan for the mri done as new area seen and given the fall worried hed a hematoma and that made the fluid and other issues worse. 4. Pt had sig fall anc ecchymosis and now coming in to be seen. 5. Pt will do egd Jan 2022.   Stressed to pt the need for social distancing and strict handwashing and wearing a mask and to follow any other new or added CDC recommendations as this is an evolving target.   Duration of visit  40 mins with 10 min of kolton prep reviewing data since when last seen and then 30 min of the face to face visit with over 50% of the time explaining pt's condition and treatment plan with the pt.

## 2022-01-03 ENCOUNTER — LAB OUTSIDE AN ENCOUNTER (OUTPATIENT)
Dept: URBAN - METROPOLITAN AREA CLINIC 86 | Facility: CLINIC | Age: 67
End: 2022-01-03

## 2022-01-04 ENCOUNTER — LAB OUTSIDE AN ENCOUNTER (OUTPATIENT)
Dept: URBAN - METROPOLITAN AREA CLINIC 92 | Facility: CLINIC | Age: 67
End: 2022-01-04

## 2022-01-04 ENCOUNTER — TELEPHONE ENCOUNTER (OUTPATIENT)
Dept: URBAN - METROPOLITAN AREA CLINIC 92 | Facility: CLINIC | Age: 67
End: 2022-01-04

## 2022-01-04 NOTE — HPI-TODAY'S VISIT:
Dear Dr Lucas Marte, January 4 MRI back. Lower thorax where seen appeared to be normal. Liver showed no fat or iron.  They do see changes of chronic liver disease with nodular contour, lobar redistribution, and fissural widening.  No suspicious lesions seen. Patent portal vein seen. Nonspecific mild marrow edema seen in the gallbladder likely felt related to your chronic liver disease.  No gallstones seen.  No bile duct dilation. Spleen enlarged up to 17 cm with some [place] bodies seen. Pancreas, adrenal glands, and kidneys appear normal. Possible circumferential wall thickening seen of the ascending colon and nonspecific and may be related due to portal colopathy from the liver disease. Perigastric and perisplenic varices seen.  Probable splenorenal shunt seen.  Portal vein, splenic vein, and SMV vein. They do see moderate ascites still present so we need to keep working on the diuretics for that as we discussed. Overall no suspicious lesions seen. Called pt re the mri report and left brief vmail and send via [employer] portal message. Dr Albarran   Addendum:  Notified by Dr Taveras that pt called back ans service. Pt called back and needs paracentesis to relieve the ascites. He says umbilical hernia is larger. He will start the lasix and aldactone like before (stopped due to some breast discomfort) and see if that and tap helps to settle this.

## 2022-01-13 ENCOUNTER — OFFICE VISIT (OUTPATIENT)
Dept: URBAN - METROPOLITAN AREA CLINIC 86 | Facility: CLINIC | Age: 67
End: 2022-01-13

## 2022-01-13 ENCOUNTER — OFFICE VISIT (OUTPATIENT)
Dept: URBAN - METROPOLITAN AREA CLINIC 91 | Facility: CLINIC | Age: 67
End: 2022-01-13

## 2022-01-17 ENCOUNTER — LAB OUTSIDE AN ENCOUNTER (OUTPATIENT)
Dept: URBAN - METROPOLITAN AREA CLINIC 86 | Facility: CLINIC | Age: 67
End: 2022-01-17

## 2022-01-24 ENCOUNTER — TELEPHONE ENCOUNTER (OUTPATIENT)
Dept: URBAN - METROPOLITAN AREA CLINIC 92 | Facility: CLINIC | Age: 67
End: 2022-01-24

## 2022-01-24 NOTE — HPI-TODAY'S VISIT:
Dear Dr Lucas Marte, January 24 ultrasound done was a limited ascites assessment.  These are specifically no significant free fluid in the abdomen and so no paracentesis were performed. This was done as mri suggested mod ascites. Called pt re the scan. He says he started daily Lasix and Aldactone daily basis. He says he felt better and he will send me labs. He says breast tenderness. I would keep the dose stable for now as low dose. Needs appt to see us in April 2022. Dr Albarran

## 2022-04-12 ENCOUNTER — OFFICE VISIT (OUTPATIENT)
Dept: URBAN - METROPOLITAN AREA CLINIC 86 | Facility: CLINIC | Age: 67
End: 2022-04-12
Payer: MEDICARE

## 2022-04-12 DIAGNOSIS — E66.3 OVERWEIGHT: ICD-10-CM

## 2022-04-12 DIAGNOSIS — I85.00 ESOPHAGEAL VARICES WITHOUT BLEEDING: ICD-10-CM

## 2022-04-12 DIAGNOSIS — R93.5 ABNORMAL ULTRASOUND OF ABDOMEN: ICD-10-CM

## 2022-04-12 DIAGNOSIS — Z79.899 HIGH RISK MEDICATION USE: ICD-10-CM

## 2022-04-12 DIAGNOSIS — R18.8 OTHER ASCITES: ICD-10-CM

## 2022-04-12 DIAGNOSIS — D69.6 THROMBOCYTOPENIA: ICD-10-CM

## 2022-04-12 DIAGNOSIS — K75.4 AUTOIMMUNE HEPATITIS: ICD-10-CM

## 2022-04-12 DIAGNOSIS — R16.1 SPLENOMEGALY: ICD-10-CM

## 2022-04-12 DIAGNOSIS — E80.4 GILBERT SYNDROME: ICD-10-CM

## 2022-04-12 DIAGNOSIS — E11.9 DIABETES: ICD-10-CM

## 2022-04-12 DIAGNOSIS — K76.89 LIVER LESION: ICD-10-CM

## 2022-04-12 DIAGNOSIS — Z71.89 VACCINE COUNSELING: ICD-10-CM

## 2022-04-12 PROCEDURE — 99214 OFFICE O/P EST MOD 30 MIN: CPT

## 2022-04-12 RX ORDER — INSULIN GLARGINE 100 [IU]/ML
30 UNITS SQ INJECT BY SUBCUTANEOUS ROUTE 1X INJECTION, SOLUTION SUBCUTANEOUS 1
Refills: 0 | Status: ACTIVE | COMMUNITY
Start: 1900-01-01

## 2022-04-12 RX ORDER — URSODIOL 300 MG/1
TAKE 1 CAPSULE BY ORAL ROUTE TID TAKE WITH FOOD CAPSULE ORAL
Qty: 90 | Refills: 1 | Status: ACTIVE | COMMUNITY

## 2022-04-12 RX ORDER — FUROSEMIDE 20 MG/1
1 TABLET TABLET ORAL ONCE A DAY
Qty: 30 TABLET | Refills: 1 | Status: ACTIVE | COMMUNITY

## 2022-04-12 RX ORDER — FUROSEMIDE 20 MG/1
1 TABLET TABLET ORAL ONCE A DAY
Qty: 30 | Refills: 3 | OUTPATIENT

## 2022-04-12 RX ORDER — SPIRONOLACTONE 50 MG/1
1 TABLET TABLET, FILM COATED ORAL ONCE A DAY
Qty: 30 | Refills: 3

## 2022-04-12 RX ORDER — URSODIOL 300 MG/1
TAKE 1 CAPSULE BY ORAL ROUTE TID TAKE WITH FOOD CAPSULE ORAL
Qty: 90 | Refills: 1

## 2022-04-12 RX ORDER — SPIRONOLACTONE 50 MG/1
1 TABLET TABLET, FILM COATED ORAL ONCE A DAY
Status: ACTIVE | COMMUNITY

## 2022-04-12 RX ORDER — PANTOPRAZOLE SODIUM 40 MG
TAKE 1 TABLET (40 MG) BY ORAL ROUTE ONCE DAILY TABLET, DELAYED RELEASE (ENTERIC COATED) ORAL 1
Qty: 0 | Refills: 0 | Status: ACTIVE | COMMUNITY
Start: 1900-01-01

## 2022-04-12 NOTE — HPI-TODAY'S VISIT:
This is a scheduled appointment for this patient, a 66 year old Other Race male, after a previous visit on Dec 2021 for follow-up evaluation of Autoimmune Hepatitis and phtn.  Pt says he took the lasix and aldactone and lost 7 pounds and appetite and then eating more and gained 2 pounds.  Still working and he is not planning to got to [facility] yet.  4-8-2022  nuetrophil 75% lymphs 13%.  crp 5.1, afp 3.1. psa 0.49,  a1c7.1%. chol 152 hdl 54 trg 87 and ldl 81.  alb 3.2 tb 1.6 db 0.4, tb 1.2 alk 72, ast 32 and alt 18.  wbc 3.3 hg 10.0, hct 31, plat 110. mcv 88.6.  neut 2475 and lympohs 429.   Not clear why staying low and may want to see heme re this and he wants to wait.  Need u.s  july 2022 January 24 ultrasound done was a limited ascites assessment.  These are specifically no significant free fluid in the abdomen and so no paracentesis were performed.  This was done as mri suggested mod ascites.  He says he started daily Lasix and Aldactone daily basis and worked for him.  He says breast tenderness is minimal.  January 4 MRI back. Lower thorax where seen appeared to be normal. Liver showed no fat or iron.  They do see changes of chronic liver disease with nodular contour, lobar redistribution, and fissural widening.  No suspicious lesions seen. Patent portal vein seen. Nonspecific mild marrow edema seen in the gallbladder likely felt related to your chronic liver disease.  No gallstones seen.  No bile duct dilation. Spleen enlarged up to 17 cm with some [place] bodies seen. Pancreas, adrenal glands, and kidneys appear normal. Possible circumferential wall thickening seen of the ascending colon and nonspecific and may be related due to portal colopathy from the liver disease. Perigastric and perisplenic varices seen.  Probable splenorenal shunt seen.  Portal vein, splenic vein, and SMV vein. They do see moderate ascites still present so we need to keep working on the diuretics for that as we discussed. Overall no suspicious lesions seen. Called pt re the mri report and left brief vmail and send via [employer] portal message.  He says umbilical hernia is larger but better now.   He is on a low salt diet.  In 2019 he had ascites and had an u/s in nov 2019 that showed trace ascites and cirrhosis, splenomegaly no lesion noted.  December 3, 2021 ultrasound shows the liver to be increased/coarsened echotexture and nodular contour compatible with cirrhosis. There is a 9 x 6 x 10 mm subcapsular hypoechoic focus seen along the medial margin of the liver that they favored to represent trace perihepatic fluid. Previously measured hyperechoic focus in the liver was not confidently seen. No bile duct dilation seen and common bile duct 4.8 mm. Gallbladder unremarkable. Right kidney 10.9 cm and left kidney 10.9 cm. No hydronephrosis. Spleen enlarged at 15.7 cm. Moderate to large volume perihepatic ascites seen. May want to consider MRI to further assess the fluid issues.  Dec 10 2021 chol 151 and hdl 48 and trg 100 and ldl 83,  IGG 2414 and  and alb 3.0 and tb 1.3 and db 0.3 and alk 72 and ast 36 and alt 16.   Nov 2 2021 fell down 16 flights and he went to work, Was slippery and gell alone. Had ecchymosis. IGG 2169 and igm 286. na 142 and k 4.2 and cl 108 and co2 28 and wbc 3.1 hg 10.8 and plat 114. mcv 86.2. afp 2.5 and psa 0.71.a1c 5.7%. iron 30%.  January 27 2021 colonoscopy with Dr. Melchor notes a diminutive polyp in the descending colon there was removed with cold forceps a diminutive polyp in the transverse colon that was removed with cold forcep scattered diverticuli seen in the left colon nonbleeding rectal varices and prior hemorrhoid surgery scar was seen.  January 27 2021 EGD showed post variceal banding scar lower third esophagus no appreciable ascites single greater than 50 mm sessile polyps in the gastric antrum and biopsies were taken for histology. A repeat EGD was recommended for 1 year. Gastric biopsy came back inflammatory type polyp with foveolar hyperplasia and granulation tissue. The transverse colon and ascending colon polyps were tubular adenomas.  He is to do april egd with DR Melchor.  Jan 20 mri: Morphologic changes of chronic liver disease and stigmata of portal hypertension with abdominal varices and splenomegaly. No HCC seen. No liver fat or iron. Morphologic changes of chronic liver disease including nodular contour, lobar redistribution, fissural widening, and diffuse background of delayed reticular enhancement compatible with fibrosis. Portal vein is patent. Spleen is enlarged measuring up to 17.8. Pancreas and kidneys normal. Small perigastric and perisplenic varices are present. Small volume perihepatic ascites.  Given ascites worse post that need the mri.  Dec 2020 u.s: cirrhotic appearing liver. Small 5x8x6 mm lesion seen in right lobe and indeterminate. They recommend mri.  Liver patent vessels.  Spleen enlarged. Trace ascites. Spleen 16cm.  Right kidney 11.1cm. Pancreas normal. Common bile duct 3.4mm.   Sept 20202 labs: alb 3.3 and tb 0.8 and db 0.2 and alk 79 and ast 35 and alt 21 and ideal  less than 35.  wbc 2.4 little low and hg low 9.9 and mcv 85.8.  Platelts clumped. crp 2.4. afp normal 2.2 and psa 1.0. a1c 6.9%.   Sept 2020: U.s: they see signs of cirrhosis, with signs of portal hypertension. Small volume ascites and splenomegaly.  No suspicious liver lesions. Patent vessels. Spleen 16cm enlarged.  Right kidney 11.6cm. Pancreas normal. No stones in gallbladder. No liver lesions.   2-4-20 Dr Melchor did egd: post variceal scar in lower esophagus. 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 single 15mm polyp in gastric antrum with adjacent scar from prior incomplete polypectomy. Removed hot snare. two 4-8mm sessile polyps in gastric bdy, Hot snare.  He has not had one since and to do Dec 16 2020 with Dr Melchor and he will do colon also.  His last colonoscopy was in December 2019 there was a fair prep.  He had a few polyps which were removed.    path: Pathologic Diagnosis STOMACH, POLYPS, POLYPECTOMY: HYPERPLASTIC POLYP (FRAGMENTS OF) WITH INFLAMMATORY CHANGE, SUPERFICIAL EROSION, GRANULATION TISSUE FORMATION AND CHANGES SUGGESTIVE OF MUCOSAL PROLAPSE.   ag Electronically Signed by Aramis Silverio MD, PhD, Pathologist Location: [facility]  1-28-20 u.s with hepatic cirrhosis and no focal mass seen and splenomegaly seen.  right kidney 12.3cm.  imaged pancreas portions unremarkable. No gallstones. cbd 3mm.  Liver irregular capsule.   3-19-20: ast 32 and alt 18 alk 71 and wbc 2.5 hg 10.2 and plat 78. afp 1.8. psa 1.1.  12-13-19 inr 1.2 and glu 198 and cr 0.65 and na 137 k 4.3 and cl 104 and co2 29 and ca 8.5 and alb 3.4 and tb 0.9 and alk 77 and ast 26 and alt 16.a1c 6.4%.wbc 2.5 hg 11.5 plat 91.  Walking every day still.  Prior he has seen Dr. Abrams. may want to check iron and do that and if low do the iron.  12/13/19 inr 1.2 gluc 198 na 137 cr 0.65 tb 0.9 alp 77 ast 26 alt 16 hemoglobin a1c 6.4% wbc low 2.5 neutrophils 1608 hgb 11.5 plt 91 afp 2.0    Egd 12/3/19  grade 2 varices were banded. single 20mm sessile polyp resection was incomplete due to polypectomy being technically difficult and complex.   Document Type: US Abdomen Complete Document Date: August 23, 2019 17:33  Document Status: Auth (Verified) Document Title: US Abdomen Complete Performed By: Dequan Koch  Verified By: Dequan Koch on August 23, 2019 17:38  Encounter info: [id number], [facility], Single Visit OP, 8/23/2019 - 8/23/2019   * Final Report *  Reason For Exam Liver dz, HTN  DM   Lower extremity edema  REPORT EXAM: US Abdomen Complete  CLINICAL INDICATION: Liver disorder specified;Liver dz, HTN  DM   Lower extremity edema. DM   HTN  Liver DZ  TECHNIQUE: Axial and longitudinal images were obtained of the upper abdomen using real-time ultrasound. [id number].[id number]  COMPARISON: Remote MRI of 11/17/2015  FINDINGS:  Liver: Diffuse cirrhotic changes. No lesions. Portal vein is patent, peak velocity approximately 10 cm/s. Hepatic veins are patent.  Bile Ducts: No dilated intrahepatic biliary radicles. Common bile duct measu2.4 mm.  Gallbladder: Normal. Cheney's sign is negative.  Pancreas: Normal.  Right Kidney: Measures 11.6 cm. Normal echogenicity. No hydronephrosis. No focal lesion  Left Kidney: Measures 10.9 cm. Normal echogenicity. No hydronephrosis. No focal lesion  Spleen: Measures: 16.5 cm. Homogeneous appearance.  Aorta: Not well seen  IVC: Normal proximally, not imaged distally.   Other: Small quantities of scattered simple appearing ascitic fluid.  IMPRESSION:    Redemonstrated cirrhotic changes within the liver. No focal lesion identified. Preserved hepatopedal portal flow. Portal hypertension changes as evidenced by small quantities of ascites and splenomegaly.   Signature Line *** Final ***  Electronically Signed By:  Dequan Koch on  08/23/2019 17:38  Dictated by:  Dequan Koch  Sept 13 2019 chol 169 and hdl 61 and trg 52 and ldl 95, alb 3.5 and globulin 3.9 elevated and tb 2.2 and db 0.5 and ib 1.7 and alk 90 and ast 27 and alt 17,  wbc 3.2 hg 11.8 and plat 92 and crp 5.2.  May 2019 wbc 5 hg 8.0 and plat 144 and inr 1.2. alb 3.5 and tb 0.8 and db 0.2 and alk 65 and ast 26 and alt 15.  afp 1.6 and a1c 6.1%.   When he was low he did get the iron back then.  Dr. Arias for an umbilical hernia and hemorrhoidectomy which was done on 11/16/18, with re-expolration on 11/24/18 and on 12/11/18 for large rectal varices noted on colonocopy.  Prior to 2019  had been off the diuretics since July 2018.    4/24/19 labs albumin low 3.5 alt 65 ast 26 alt 15 wbc low 3.2 hgb low 7.8 mcv low 74.4 plt low 103 afp 1.6 psa 1 c-reactive protein 5.4 vit d 22 hdl low 39 hemoglobin a1c 6.1%   12/2/18 na 138 k 4.0 cr 0.73 albumin 3.3 alt 17 ast 31 alp 52 wbc low 4 hgb low 8.5 plt low 140 inr 1.48 tb 1.3 MELD 12 Sept 21 2018 a1c 6.4 and alb 4.2 and tb 0.5 and db 0.2 and alk 65 and ast 32 and alt 22 and tp 7.4, chol 160 and trg 83 and hdl 60 and ldl 83. wbc 2.2 hg 8.7 and plat 88 afp 1.9.   U/s: July 2018: Cirrhosis and no ascites and no masses.  Egd May 2018 no varices noted, gastric polyp noted and removed.  Egd 6/4/18 post variceal banding scar found, no varices noted, one cratered gastric ulcer with no stigmata of bleeding found in gastric antrum. duodenum was normal.  Plan: 1, Do lasix 20mg qd. Aldactone every day.  2. Tolerated the aldactone.  3. Pt will do the u.s in July. 4. DO labs locally and sent. 5. If anemia not better look at seeing heme. 6. Needs egd and pending with Dr Melchor.  Stressed to pt the need for social distancing and strict handwashing and wearing a mask and to follow any other new or added CDC recommendations as this is an evolving target.   Duration of visit 35  mins with 5 min of chart prep reviewing data since when last seen and then 30 min of the face to face visit with over 50% of the time explaining pt's condition and treatment plan with the pt.

## 2022-04-12 NOTE — EXAM-PHYSICAL EXAM
Gen: awake and responsive. Eyes: anicteric, normal lids. Mouth: covered with mask. Nose: covered with mask. Hearing: intact grossly. Neck: trachea midline and no jvd. CV: RRR no s3. Lungs: clear. No wheezes, Abd: Soft, nabs, NR, NT. No fluid wave and cannot feel liver and spleen tip trace palpable. Ext: trace edema, some palm erythema. Neuro: moves all 4 ext grossly. No asterixis. Skin: no pruritis and some palm erythema.

## 2022-06-01 ENCOUNTER — TELEPHONE ENCOUNTER (OUTPATIENT)
Dept: URBAN - METROPOLITAN AREA CLINIC 92 | Facility: CLINIC | Age: 67
End: 2022-06-01

## 2022-06-01 NOTE — HPI-TODAY'S VISIT:
Dr Marte, I think a trip to [place] with an anemia of 8,7 and cirrhosis hx is a risk and so would avise against it.  We will refer you to Dr Abrams re the need for iv iron.  Tried to call but went to [place] so sending via portal.

## 2022-07-11 ENCOUNTER — LAB OUTSIDE AN ENCOUNTER (OUTPATIENT)
Dept: URBAN - METROPOLITAN AREA CLINIC 86 | Facility: CLINIC | Age: 67
End: 2022-07-11

## 2022-08-02 ENCOUNTER — TELEPHONE ENCOUNTER (OUTPATIENT)
Dept: URBAN - METROPOLITAN AREA CLINIC 92 | Facility: CLINIC | Age: 67
End: 2022-08-02

## 2022-08-02 ENCOUNTER — LAB OUTSIDE AN ENCOUNTER (OUTPATIENT)
Dept: URBAN - METROPOLITAN AREA CLINIC 86 | Facility: CLINIC | Age: 67
End: 2022-08-02

## 2022-08-02 ENCOUNTER — OFFICE VISIT (OUTPATIENT)
Dept: URBAN - METROPOLITAN AREA CLINIC 86 | Facility: CLINIC | Age: 67
End: 2022-08-02
Payer: MEDICARE

## 2022-08-02 ENCOUNTER — OFFICE VISIT (OUTPATIENT)
Dept: URBAN - METROPOLITAN AREA CLINIC 91 | Facility: CLINIC | Age: 67
End: 2022-08-02
Payer: MEDICARE

## 2022-08-02 VITALS
SYSTOLIC BLOOD PRESSURE: 130 MMHG | BODY MASS INDEX: 26.36 KG/M2 | DIASTOLIC BLOOD PRESSURE: 56 MMHG | WEIGHT: 178 LBS | TEMPERATURE: 97.8 F | HEART RATE: 64 BPM | HEIGHT: 69 IN

## 2022-08-02 DIAGNOSIS — R16.1 SPLENOMEGALY: ICD-10-CM

## 2022-08-02 DIAGNOSIS — E66.3 OVERWEIGHT: ICD-10-CM

## 2022-08-02 DIAGNOSIS — R18.8 OTHER ASCITES: ICD-10-CM

## 2022-08-02 DIAGNOSIS — E11.9 DIABETES: ICD-10-CM

## 2022-08-02 DIAGNOSIS — K75.4 AUTOIMMUNE HEPATITIS: ICD-10-CM

## 2022-08-02 DIAGNOSIS — D69.6 THROMBOCYTOPENIA: ICD-10-CM

## 2022-08-02 DIAGNOSIS — R93.5 ABNORMAL ULTRASOUND OF ABDOMEN: ICD-10-CM

## 2022-08-02 DIAGNOSIS — E80.4 GILBERT SYNDROME: ICD-10-CM

## 2022-08-02 DIAGNOSIS — I85.00 ESOPHAGEAL VARICES WITHOUT BLEEDING: ICD-10-CM

## 2022-08-02 DIAGNOSIS — Z71.89 VACCINE COUNSELING: ICD-10-CM

## 2022-08-02 DIAGNOSIS — K76.89 LIVER LESION: ICD-10-CM

## 2022-08-02 DIAGNOSIS — K74.60 CIRRHOSIS OF LIVER WITH ASCITES: ICD-10-CM

## 2022-08-02 DIAGNOSIS — Z79.899 HIGH RISK MEDICATION USE: ICD-10-CM

## 2022-08-02 PROBLEM — 15633921000119109 ULTRASONOGRAPHY OF ABDOMEN ABNORMAL: Status: ACTIVE | Noted: 2022-08-02

## 2022-08-02 PROBLEM — 389026000: Status: ACTIVE | Noted: 2020-09-22

## 2022-08-02 PROBLEM — 300331000 LESION OF LIVER: Status: ACTIVE | Noted: 2021-12-13

## 2022-08-02 PROCEDURE — 76705 ECHO EXAM OF ABDOMEN: CPT

## 2022-08-02 PROCEDURE — 93975 VASCULAR STUDY: CPT

## 2022-08-02 PROCEDURE — 99214 OFFICE O/P EST MOD 30 MIN: CPT

## 2022-08-02 RX ORDER — PANTOPRAZOLE SODIUM 40 MG
TAKE 1 TABLET (40 MG) BY ORAL ROUTE ONCE DAILY TABLET, DELAYED RELEASE (ENTERIC COATED) ORAL 1
Qty: 0 | Refills: 0 | Status: ACTIVE | COMMUNITY
Start: 1900-01-01

## 2022-08-02 RX ORDER — URSODIOL 300 MG/1
TAKE 1 CAPSULE BY ORAL ROUTE TID TAKE WITH FOOD CAPSULE ORAL
Qty: 90 | Refills: 1

## 2022-08-02 RX ORDER — URSODIOL 300 MG/1
TAKE 1 CAPSULE BY ORAL ROUTE TID TAKE WITH FOOD CAPSULE ORAL
Qty: 90 | Refills: 1 | Status: ACTIVE | COMMUNITY

## 2022-08-02 RX ORDER — FUROSEMIDE 20 MG/1
1 TABLET TABLET ORAL ONCE A DAY
Qty: 30 | Refills: 3 | OUTPATIENT

## 2022-08-02 RX ORDER — SPIRONOLACTONE 50 MG/1
1 TABLET TABLET, FILM COATED ORAL ONCE A DAY
Qty: 30 | Refills: 3 | Status: ON HOLD | COMMUNITY

## 2022-08-02 RX ORDER — INSULIN GLARGINE 100 [IU]/ML
30 UNITS SQ INJECT BY SUBCUTANEOUS ROUTE 1X INJECTION, SOLUTION SUBCUTANEOUS 1
Refills: 0 | Status: ACTIVE | COMMUNITY
Start: 1900-01-01

## 2022-08-02 RX ORDER — FUROSEMIDE 20 MG/1
1 TABLET TABLET ORAL ONCE A DAY
Qty: 30 | Refills: 3 | Status: ACTIVE | COMMUNITY

## 2022-08-02 RX ORDER — SPIRONOLACTONE 50 MG/1
1 TABLET TABLET, FILM COATED ORAL ONCE A DAY
Qty: 30 | Refills: 3

## 2022-08-02 RX ORDER — SPIRONOLACTONE 50 MG/1
1 TABLET TABLET, FILM COATED ORAL ONCE A DAY
Qty: 30 | Refills: 3 | Status: ACTIVE | COMMUNITY

## 2022-08-02 NOTE — HPI-TODAY'S VISIT:
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  Pt says he has gained some weight and not feelinglike fluid.  Pt says he taking the lasix alone.  Not taking the aldactone.  Discussed need for this.  Labs from Dr. Abrams dated July 2022 show a haptoglobin low at 13.  Reticulocyte count normal at 2%.  June labs show Gammaglobulin up at 2.2 with no M spike.  Erythropoietin level elevated at 41.2.  Methylmalonic acid normal at 84.  Haptoglobin 921.  C-reactive protein of 2.  Need to get some labs to see how doing. he sees next week. He is giving more iron to pt.  4-8-2022  nuetrophil 75% lymphs 13%.  crp 5.1, afp 3.1. psa 0.49,  a1c7.1%. chol 152 hdl 54 trg 87 and ldl 81.  alb 3.2 tb 1.6 db 0.4, tb 1.2 alk 72, ast 32 and alt 18.  wbc 3.3 hg 10.0, hct 31, plat 110. mcv 88.6.  neut 2475 and lymphs 429.   Needing u.s  july 2022 and did today. He is not sure if she mentioned if he had fluid.  January 24 ultrasound done was a limited ascites assessment.  These are specifically no significant free fluid in the abdomen and so no paracentesis were performed.  This was done as mri suggested mod ascites.  He had started daily Lasix and Aldactone daily basis and worked for him.  He says breast tenderness is minimal.  January 4 MRI back. Lower thorax where seen appeared to be normal. Liver showed no fat or iron.  They do see changes of chronic liver disease with nodular contour, lobar redistribution, and fissural widening.  No suspicious lesions seen. Patent portal vein seen. Nonspecific mild edema seen in the gallbladder likely felt related to your chronic liver disease.  No gallstones seen.  No bile duct dilation. Spleen enlarged up to 17 cm with some Gamna-Patricia bodies seen. Pancreas, adrenal glands, and kidneys appear normal. Possible circumferential wall thickening seen of the ascending colon and nonspecific and may be related due to portal colopathy from the liver disease. Perigastric and perisplenic varices seen.  Probable splenorenal shunt seen.  Portal vein, splenic vein, and SMV vein. They do see moderate ascites still present so we need to keep working on the diuretics for that as we discussed. Overall no suspicious lesions seen. Called pt re the mri report and left brief vmail and send via [facility] portal message.  He is on a low salt diet.  In 2019 he had ascites and had an u/s in nov 2019 that showed trace ascites and cirrhosis, splenomegaly no lesion noted.  December 3, 2021 ultrasound shows the liver to be increased/coarsened echotexture and nodular contour compatible with cirrhosis. There is a 9 x 6 x 10 mm subcapsular hypoechoic focus seen along the medial margin of the liver that they favored to represent trace perihepatic fluid. Previously measured hyperechoic focus in the liver was not confidently seen. No bile duct dilation seen and common bile duct 4.8 mm. Gallbladder unremarkable. Right kidney 10.9 cm and left kidney 10.9 cm. No hydronephrosis. Spleen enlarged at 15.7 cm. Moderate to large volume perihepatic ascites seen. May want to consider MRI to further assess the fluid issues.  Dec 10 2021 chol 151 and hdl 48 and trg 100 and ldl 83,  IGG 2414 and  and alb 3.0 and tb 1.3 and db 0.3 and alk 72 and ast 36 and alt 16.   Nov 2 2021 fell down 16 flights and he went to work, Was slippery and gell alone. Had ecchymosis. IGG 2169 and igm 286. na 142 and k 4.2 and cl 108 and co2 28 and wbc 3.1 hg 10.8 and plat 114. mcv 86.2. afp 2.5 and psa 0.71.a1c 5.7%. iron 30%.  January 27 2021 colonoscopy with Dr. Melchor notes a diminutive polyp in the descending colon there was removed with cold forceps a diminutive polyp in the transverse colon that was removed with cold forcep scattered diverticuli seen in the left colon nonbleeding rectal varices and prior hemorrhoid surgery scar was seen.  January 27 2021 EGD showed post variceal banding scar lower third esophagus no appreciable ascites single greater than 50 mm sessile polyps in the gastric antrum and biopsies were taken for histology. A repeat EGD was recommended for 1 year. Gastric biopsy came back inflammatory type polyp with foveolar hyperplasia and granulation tissue. The transverse colon and ascending colon polyps were tubular adenomas.  He was to do april egd with DR Melchor and to do end of august 2022.  Jan 20 mri: Morphologic changes of chronic liver disease and stigmata of portal hypertension with abdominal varices and splenomegaly. No HCC seen. No liver fat or iron. Morphologic changes of chronic liver disease including nodular contour, lobar redistribution, fissural widening, and diffuse background of delayed reticular enhancement compatible with fibrosis. Portal vein is patent. Spleen is enlarged measuring up to 17.8. Pancreas and kidneys normal. Small perigastric and perisplenic varices are present. Small volume perihepatic ascites.  Dec 2020 u.s: cirrhotic appearing liver. Small 5x8x6 mm lesion seen in right lobe and indeterminate. They recommend mri.  Liver patent vessels.  Spleen enlarged. Trace ascites. Spleen 16cm.  Right kidney 11.1cm. Pancreas normal. Common bile duct 3.4mm.   Sept 20202 labs: alb 3.3 and tb 0.8 and db 0.2 and alk 79 and ast 35 and alt 21 and ideal  less than 35.  wbc 2.4 little low and hg low 9.9 and mcv 85.8.  Platelts clumped. crp 2.4. afp normal 2.2 and psa 1.0. a1c 6.9%.   Sept 2020: U.s: they see signs of cirrhosis, with signs of portal hypertension. Small volume ascites and splenomegaly.  No suspicious liver lesions. Patent vessels. Spleen 16cm enlarged.  Right kidney 11.6cm. Pancreas normal. No stones in gallbladder. No liver lesions.   2-4-20 Dr Melchor did egd: post variceal scar in lower esophagus. 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 single 15mm polyp in gastric antrum with adjacent scar from prior incomplete polypectomy. Removed hot snare. two 4-8mm sessile polyps in gastric bdy, Hot snare.  He has not had one since and to do Dec 16 2020 with Dr Melchor and he will do colon also.  His last colonoscopy was in December 2019 there was a fair prep.  He had a few polyps which were removed.    path: Pathologic Diagnosis STOMACH, POLYPS, POLYPECTOMY: HYPERPLASTIC POLYP (FRAGMENTS OF) WITH INFLAMMATORY CHANGE, SUPERFICIAL EROSION, GRANULATION TISSUE FORMATION AND CHANGES SUGGESTIVE OF MUCOSAL PROLAPSE.   ag Electronically Signed by Aramis Silverio MD, PhD, Pathologist Location: [facility]  1-28-20 u.s with hepatic cirrhosis and no focal mass seen and splenomegaly seen.  right kidney 12.3cm.  imaged pancreas portions unremarkable. No gallstones. cbd 3mm.  Liver irregular capsule.   3-19-20: ast 32 and alt 18 alk 71 and wbc 2.5 hg 10.2 and plat 78. afp 1.8. psa 1.1.  12-13-19 inr 1.2 and glu 198 and cr 0.65 and na 137 k 4.3 and cl 104 and co2 29 and ca 8.5 and alb 3.4 and tb 0.9 and alk 77 and ast 26 and alt 16.a1c 6.4%.wbc 2.5 hg 11.5 plat 91.  Walking every day still.  Prior he has seen Dr. Abrams. may want to check iron and do that and if low do the iron.  12/13/19 inr 1.2 gluc 198 na 137 cr 0.65 tb 0.9 alp 77 ast 26 alt 16 hemoglobin a1c 6.4% wbc low 2.5 neutrophils 1608 hgb 11.5 plt 91 afp 2.0    Egd 12/3/19  grade 2 varices were banded. single 20mm sessile polyp resection was incomplete due to polypectomy being technically difficult and complex.   Document Type: US Abdomen Complete Document Date: August 23, 2019 17:33  Document Status: Auth (Verified) Document Title: US Abdomen Complete Performed By: Dequan Koch  Verified By: Dequan Koch on August 23, 2019 17:38  Encounter info: [id number], [facility], Single Visit OP, 8/23/2019 - 8/23/2019   * Final Report *  Reason For Exam Liver dz, HTN  DM   Lower extremity edema  REPORT EXAM: US Abdomen Complete  CLINICAL INDICATION: Liver disorder specified;Liver dz, HTN  DM   Lower extremity edema. DM   HTN  Liver DZ  TECHNIQUE: Axial and longitudinal images were obtained of the upper abdomen using real-time ultrasound. [id number]  COMPARISON: Remote MRI of 11/17/2015  FINDINGS:  Liver: Diffuse cirrhotic changes. No lesions. Portal vein is patent, peak velocity approximately 10 cm/s. Hepatic veins are patent.  Bile Ducts: No dilated intrahepatic biliary radicles. Common bile duct measu2.4 mm.  Gallbladder: Normal. Cheney's sign is negative.  Pancreas: Normal.  Right Kidney: Measures 11.6 cm. Normal echogenicity. No hydronephrosis. No focal lesion  Left Kidney: Measures 10.9 cm. Normal echogenicity. No hydronephrosis. No focal lesion  Spleen: Measures: 16.5 cm. Homogeneous appearance.  Aorta: Not well seen  IVC: Normal proximally, not imaged distally.   Other: Small quantities of scattered simple appearing ascitic fluid.  IMPRESSION:    Redemonstrated cirrhotic changes within the liver. No focal lesion identified. Preserved hepatopedal portal flow. Portal hypertension changes as evidenced by small quantities of ascites and splenomegaly.   Signature Line *** Final ***  Electronically Signed By:  Dequan Koch on  08/23/2019 17:38  Dictated by:  Dequan Koch  Sept 13 2019 chol 169 and hdl 61 and trg 52 and ldl 95, alb 3.5 and globulin 3.9 elevated and tb 2.2 and db 0.5 and ib 1.7 and alk 90 and ast 27 and alt 17,  wbc 3.2 hg 11.8 and plat 92 and crp 5.2.  May 2019 wbc 5 hg 8.0 and plat 144 and inr 1.2. alb 3.5 and tb 0.8 and db 0.2 and alk 65 and ast 26 and alt 15.  afp 1.6 and a1c 6.1%.   When he was low he did get the iron back then.  Dr. Arias for an umbilical hernia and hemorrhoidectomy which was done on 11/16/18, with re-expolration on 11/24/18 and on 12/11/18 for large rectal varices noted on colonocopy.  Prior to 2019  had been off the diuretics since July 2018.    4/24/19 labs albumin low 3.5 alt 65 ast 26 alt 15 wbc low 3.2 hgb low 7.8 mcv low 74.4 plt low 103 afp 1.6 psa 1 c-reactive protein 5.4 vit d 22 hdl low 39 hemoglobin a1c 6.1%   12/2/18 na 138 k 4.0 cr 0.73 albumin 3.3 alt 17 ast 31 alp 52 wbc low 4 hgb low 8.5 plt low 140 inr 1.48 tb 1.3 MELD 12 Sept 21 2018 a1c 6.4 and alb 4.2 and tb 0.5 and db 0.2 and alk 65 and ast 32 and alt 22 and tp 7.4, chol 160 and trg 83 and hdl 60 and ldl 83. wbc 2.2 hg 8.7 and plat 88 afp 1.9.   U/s: July 2018: Cirrhosis and no ascites and no masses.  Egd May 2018 no varices noted, gastric polyp noted and removed.  Egd 6/4/18 post variceal banding scar found, no varices noted, one cratered gastric ulcer with no stigmata of bleeding found in gastric antrum. duodenum was normal.  Plan: 1, Encouraged ot to stay on the balanced lasix 20mg qdand aldactone every day. 2. Weight is up and that is doing. 3. Check on the u.s that he did.' 4. Pt doing egd end of aug, 5. RTC in 3m to follow course.  Stressed to pt the need for social distancing and strict handwashing and wearing a mask and to follow any other new or added CDC recommendations as this is an evolving target.   Duration of visit  30 mins with 10 min of chart prep reviewing data since when last seen and then 20 min of the face to face visit with over 50% of the time explaining pt's condition and treatment plan with the pt.

## 2022-08-02 NOTE — HPI-TODAY'S VISIT:
Dear Dr Lucas Marte, Aug 2 ultrasound report came back that the liver appeared only heterogeneous but that the contour was lobulated with this appearance suggesting cirrhosis. The common bile duct was normal at 5 mm. No gallstones were seen but with nonspecific thickening of the gallbladder wall seen.  Not uncommon to be seen with ascites. Did mention ascites but they did not qualify it as being mild or moderate or severe, but by exam I thought it was at best mild.  Visualized portions appear unremarkable. Right kidney 11.7cm. No hydronephrosis. Liver vessels patent but they felt complete patency was difficult to evaluate and they could not exclude a partial clot. They recommend to consider a follow more advanced imaging such as an mri. Left a vmail for pt to consider to do mri to be sure. Asked him to call back if agrees. He wants to see how does with the diuretics and see what labs show and we can redo the u.s limited for this via. Dr Albarran

## 2022-08-02 NOTE — EXAM-PHYSICAL EXAM
Gen: awake and responsive. Eyes: anicteric, normal lids. Mouth: covered with mask. Nose: covered with mask. Hearing: intact grossly. Neck: trachea midline and no jvd. CV: RRR no s3. Lungs: clear. No wheezes, Abd: Soft, nabs, nr, NT. Mildly protuberant but not clear fluid wave. Spleen tip palpable. Ext: no sig edema, some palm erythema. Neuro: moves all 4 ext grossly. No asterixis. Skin: no pruritis and some palm erythema.

## 2022-08-09 ENCOUNTER — TELEPHONE ENCOUNTER (OUTPATIENT)
Dept: URBAN - METROPOLITAN AREA CLINIC 92 | Facility: CLINIC | Age: 67
End: 2022-08-09

## 2022-08-12 PROBLEM — 266468003 CIRRHOSIS - NON-ALCOHOLIC: Status: ACTIVE | Noted: 2022-08-12

## 2022-08-29 ENCOUNTER — LAB OUTSIDE AN ENCOUNTER (OUTPATIENT)
Dept: URBAN - METROPOLITAN AREA CLINIC 92 | Facility: CLINIC | Age: 67
End: 2022-08-29

## 2022-09-14 ENCOUNTER — TELEPHONE ENCOUNTER (OUTPATIENT)
Dept: URBAN - METROPOLITAN AREA CLINIC 86 | Facility: CLINIC | Age: 67
End: 2022-09-14

## 2022-09-14 ENCOUNTER — TELEPHONE ENCOUNTER (OUTPATIENT)
Dept: URBAN - METROPOLITAN AREA CLINIC 92 | Facility: CLINIC | Age: 67
End: 2022-09-14

## 2022-11-02 ENCOUNTER — LAB OUTSIDE AN ENCOUNTER (OUTPATIENT)
Dept: URBAN - METROPOLITAN AREA CLINIC 86 | Facility: CLINIC | Age: 67
End: 2022-11-02

## 2022-11-08 ENCOUNTER — OFFICE VISIT (OUTPATIENT)
Dept: URBAN - METROPOLITAN AREA CLINIC 86 | Facility: CLINIC | Age: 67
End: 2022-11-08

## 2023-02-20 ENCOUNTER — OFFICE VISIT (OUTPATIENT)
Dept: URBAN - METROPOLITAN AREA CLINIC 92 | Facility: CLINIC | Age: 68
End: 2023-02-20
Payer: MEDICARE

## 2023-02-20 ENCOUNTER — TELEPHONE ENCOUNTER (OUTPATIENT)
Dept: URBAN - METROPOLITAN AREA CLINIC 92 | Facility: CLINIC | Age: 68
End: 2023-02-20

## 2023-02-20 VITALS
BODY MASS INDEX: 26.07 KG/M2 | WEIGHT: 176 LBS | TEMPERATURE: 97 F | HEART RATE: 77 BPM | DIASTOLIC BLOOD PRESSURE: 65 MMHG | HEIGHT: 69 IN | SYSTOLIC BLOOD PRESSURE: 124 MMHG

## 2023-02-20 DIAGNOSIS — K31.7 GASTRIC POLYP: ICD-10-CM

## 2023-02-20 DIAGNOSIS — I85.00 ESOPHAGEAL VARICES: ICD-10-CM

## 2023-02-20 DIAGNOSIS — K74.60 CIRRHOSIS: ICD-10-CM

## 2023-02-20 DIAGNOSIS — K64.9 RECTAL VARICES: ICD-10-CM

## 2023-02-20 DIAGNOSIS — K63.5 COLON POLYP: ICD-10-CM

## 2023-02-20 PROBLEM — 255417007 CIRRHOTIC: Status: ACTIVE | Noted: 2022-01-04

## 2023-02-20 PROBLEM — 28670008 ESOPHAGEAL VARICES: Status: ACTIVE | Noted: 2023-02-20

## 2023-02-20 PROBLEM — 78809005 GASTRIC POLYP: Status: ACTIVE | Noted: 2023-02-20

## 2023-02-20 PROCEDURE — 99214 OFFICE O/P EST MOD 30 MIN: CPT | Performed by: INTERNAL MEDICINE

## 2023-02-20 RX ORDER — FUROSEMIDE 20 MG/1
1 TABLET TABLET ORAL ONCE A DAY
Qty: 30 | Refills: 3 | Status: ACTIVE | COMMUNITY

## 2023-02-20 RX ORDER — PANTOPRAZOLE SODIUM 40 MG
TAKE 1 TABLET (40 MG) BY ORAL ROUTE ONCE DAILY TABLET, DELAYED RELEASE (ENTERIC COATED) ORAL 1
Qty: 0 | Refills: 0 | Status: ACTIVE | COMMUNITY
Start: 1900-01-01

## 2023-02-20 RX ORDER — URSODIOL 300 MG/1
TAKE 1 CAPSULE BY ORAL ROUTE TID TAKE WITH FOOD CAPSULE ORAL
Qty: 90 | Refills: 1 | Status: ACTIVE | COMMUNITY

## 2023-02-20 RX ORDER — SPIRONOLACTONE 50 MG/1
1 TABLET TABLET, FILM COATED ORAL ONCE A DAY
Qty: 30 | Refills: 3 | Status: ACTIVE | COMMUNITY

## 2023-02-20 RX ORDER — INSULIN GLARGINE 100 [IU]/ML
30 UNITS SQ INJECT BY SUBCUTANEOUS ROUTE 1X INJECTION, SOLUTION SUBCUTANEOUS 1
Refills: 0 | Status: ACTIVE | COMMUNITY
Start: 1900-01-01

## 2023-02-20 NOTE — HPI-TODAY'S VISIT:
This is a 67-year-old male presents for follow-up.  Overall he has done quite well.  Over the last few days he has had some rectal bleeding.  He has no other particular gastrointestinal complaints at this time.

## 2023-02-21 ENCOUNTER — OUT OF OFFICE VISIT (OUTPATIENT)
Dept: URBAN - METROPOLITAN AREA MEDICAL CENTER 12 | Facility: MEDICAL CENTER | Age: 68
End: 2023-02-21
Payer: MEDICARE

## 2023-02-21 ENCOUNTER — TELEPHONE ENCOUNTER (OUTPATIENT)
Dept: URBAN - METROPOLITAN AREA CLINIC 105 | Facility: CLINIC | Age: 68
End: 2023-02-21

## 2023-02-21 DIAGNOSIS — Z87.19 ESOPHAGEAL FOOD BOLUS: ICD-10-CM

## 2023-02-21 DIAGNOSIS — K62.5 ANAL BLEEDING: ICD-10-CM

## 2023-02-21 DIAGNOSIS — K75.4 AUTOIMMUNE HEPATITIS: ICD-10-CM

## 2023-02-21 PROCEDURE — 99222 1ST HOSP IP/OBS MODERATE 55: CPT | Performed by: PHYSICIAN ASSISTANT

## 2023-02-21 PROCEDURE — 99233 SBSQ HOSP IP/OBS HIGH 50: CPT | Performed by: PHYSICIAN ASSISTANT

## 2023-02-21 PROCEDURE — G8427 DOCREV CUR MEDS BY ELIG CLIN: HCPCS | Performed by: PHYSICIAN ASSISTANT

## 2023-02-22 ENCOUNTER — OUT OF OFFICE VISIT (OUTPATIENT)
Dept: URBAN - METROPOLITAN AREA MEDICAL CENTER 12 | Facility: MEDICAL CENTER | Age: 68
End: 2023-02-22
Payer: MEDICARE

## 2023-02-22 DIAGNOSIS — K62.89 ACUTE PROCTITIS: ICD-10-CM

## 2023-02-22 DIAGNOSIS — K31.89 ACQUIRED DEFORMITY OF DUODENUM: ICD-10-CM

## 2023-02-22 DIAGNOSIS — D12.5 ADENOMA OF SIGMOID COLON: ICD-10-CM

## 2023-02-22 DIAGNOSIS — K75.4 AUTOIMMUNE HEPATITIS: ICD-10-CM

## 2023-02-22 DIAGNOSIS — K74.69 CIRRHOSIS, CRYPTOGENIC: ICD-10-CM

## 2023-02-22 PROCEDURE — 45385 COLONOSCOPY W/LESION REMOVAL: CPT | Performed by: INTERNAL MEDICINE

## 2023-02-22 PROCEDURE — 43239 EGD BIOPSY SINGLE/MULTIPLE: CPT | Performed by: INTERNAL MEDICINE

## 2023-02-22 PROCEDURE — 99233 SBSQ HOSP IP/OBS HIGH 50: CPT | Performed by: INTERNAL MEDICINE

## 2023-02-24 ENCOUNTER — OUT OF OFFICE VISIT (OUTPATIENT)
Dept: URBAN - METROPOLITAN AREA MEDICAL CENTER 12 | Facility: MEDICAL CENTER | Age: 68
End: 2023-02-24
Payer: MEDICARE

## 2023-02-24 DIAGNOSIS — E80.4 GILBERT SYNDROME: ICD-10-CM

## 2023-02-24 DIAGNOSIS — K75.4 AUTOIMMUNE HEPATITIS: ICD-10-CM

## 2023-02-24 DIAGNOSIS — K92.1 ACUTE MELENA: ICD-10-CM

## 2023-02-24 PROCEDURE — 99233 SBSQ HOSP IP/OBS HIGH 50: CPT

## 2023-03-08 ENCOUNTER — OFFICE VISIT (OUTPATIENT)
Dept: URBAN - METROPOLITAN AREA MEDICAL CENTER 12 | Facility: MEDICAL CENTER | Age: 68
End: 2023-03-08
Payer: MEDICARE

## 2023-03-08 DIAGNOSIS — Z12.11 COLON CANCER SCREENING: ICD-10-CM

## 2023-03-08 PROCEDURE — 992 APS NON BILLABLE: Performed by: INTERNAL MEDICINE

## 2023-03-24 ENCOUNTER — OFFICE VISIT (OUTPATIENT)
Dept: URBAN - METROPOLITAN AREA CLINIC 86 | Facility: CLINIC | Age: 68
End: 2023-03-24
Payer: MEDICARE

## 2023-03-24 ENCOUNTER — LAB OUTSIDE AN ENCOUNTER (OUTPATIENT)
Dept: URBAN - METROPOLITAN AREA CLINIC 86 | Facility: CLINIC | Age: 68
End: 2023-03-24

## 2023-03-24 ENCOUNTER — TELEPHONE ENCOUNTER (OUTPATIENT)
Dept: URBAN - METROPOLITAN AREA CLINIC 86 | Facility: CLINIC | Age: 68
End: 2023-03-24

## 2023-03-24 ENCOUNTER — OFFICE VISIT (OUTPATIENT)
Dept: URBAN - METROPOLITAN AREA CLINIC 86 | Facility: CLINIC | Age: 68
End: 2023-03-24

## 2023-03-24 VITALS
TEMPERATURE: 98.2 F | HEIGHT: 69 IN | HEART RATE: 72 BPM | SYSTOLIC BLOOD PRESSURE: 119 MMHG | DIASTOLIC BLOOD PRESSURE: 66 MMHG | BODY MASS INDEX: 26.36 KG/M2 | WEIGHT: 178 LBS

## 2023-03-24 DIAGNOSIS — R16.1 SPLENOMEGALY: ICD-10-CM

## 2023-03-24 DIAGNOSIS — I85.10 ESOPH VARICE OTHER DIS: ICD-10-CM

## 2023-03-24 DIAGNOSIS — K75.4 AUTOIMMUNE HEPATITIS: ICD-10-CM

## 2023-03-24 DIAGNOSIS — R18.8 OTHER ASCITES: ICD-10-CM

## 2023-03-24 PROCEDURE — 99214 OFFICE O/P EST MOD 30 MIN: CPT | Performed by: PHYSICIAN ASSISTANT

## 2023-03-24 RX ORDER — INSULIN GLARGINE 100 [IU]/ML
30 UNITS SQ INJECT BY SUBCUTANEOUS ROUTE 1X INJECTION, SOLUTION SUBCUTANEOUS 1
Refills: 0 | Status: ACTIVE | COMMUNITY
Start: 1900-01-01

## 2023-03-24 RX ORDER — PANTOPRAZOLE SODIUM 40 MG
TAKE 1 TABLET (40 MG) BY ORAL ROUTE ONCE DAILY TABLET, DELAYED RELEASE (ENTERIC COATED) ORAL 1
Qty: 0 | Refills: 0 | Status: ACTIVE | COMMUNITY
Start: 1900-01-01

## 2023-03-24 RX ORDER — SPIRONOLACTONE 50 MG/1
1 TABLET TABLET, FILM COATED ORAL ONCE A DAY
Qty: 30 | Refills: 3 | Status: ACTIVE | COMMUNITY

## 2023-03-24 RX ORDER — URSODIOL 300 MG/1
TAKE 1 CAPSULE BY ORAL ROUTE TID TAKE WITH FOOD CAPSULE ORAL
Qty: 90 | Refills: 1 | Status: ACTIVE | COMMUNITY

## 2023-03-24 RX ORDER — FUROSEMIDE 20 MG/1
1 TABLET TABLET ORAL ONCE A DAY
Qty: 30 | Refills: 3 | Status: ACTIVE | COMMUNITY

## 2023-03-24 NOTE — HPI-TODAY'S VISIT:
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  Pt says he has gained some weight and not feelinglike fluid.  3/24/23  Note prep 3/23/2023 Patient was recently hospitalized due to GI bleed and underwent antegrade obliteration of varices using Scleras and embolization.  He is seen hematology.  He had a colonoscopy and EGD while in the hospital.  He had large bleeding rectal varices and a 5 mm polyp in the sigmoid colon.  He underwent paracentesis by IR with 4 L removed.  He is pending IR evaluation for possible prophylactic treatment of large perigastric collaterals with may be BRTO/CARTI 1 March 16.  He is seeing Dr. Abrams for his anemia issues and we always appreciate his care.  Dr. Albarran saw the patient while he is in the hospital.  He had a CT scan while in the hospital showing a nonocclusive clot and given the bleed options for anticoagulation were limited.  Also his MELD score is only 16 and so he would not be higher priority for transplant.  He is seeing hematology as outpatient. Dw patient need to discuss diuretics and get fluid issues undercontrol  3/17 He had paracentesis last with and 6 L removed. He is taking aldactone 50 mg lasix 20 mg he is only taking the aldactone 2 times a week and we will increase this.   recap Pt says he taking the lasix alone.  Not taking the aldactone.  Discussed need for this.  Labs from Dr. Abrams dated July 2022 show a haptoglobin low at 13.  Reticulocyte count normal at 2%.  June labs show Gammaglobulin up at 2.2 with no M spike.  Erythropoietin level elevated at 41.2.  Methylmalonic acid normal at 84.  Haptoglobin 921.  C-reactive protein of 2.  Need to get some labs to see how doing. he sees next week. He is giving more iron to pt.  4-8-2022  nuetrophil 75% lymphs 13%.  crp 5.1, afp 3.1. psa 0.49,  a1c7.1%. chol 152 hdl 54 trg 87 and ldl 81.  alb 3.2 tb 1.6 db 0.4, tb 1.2 alk 72, ast 32 and alt 18.  wbc 3.3 hg 10.0, hct 31, plat 110. mcv 88.6.  neut 2475 and lymphs 429.   Needing u.s  july 2022 and did today. He is not sure if she mentioned if he had fluid.  January 24 ultrasound done was a limited ascites assessment.  These are specifically no significant free fluid in the abdomen and so no paracentesis were performed.  This was done as mri suggested mod ascites.  He had started daily Lasix and Aldactone daily basis and worked for him.  He says breast tenderness is minimal.  January 4 MRI back. Lower thorax where seen appeared to be normal. Liver showed no fat or iron.  They do see changes of chronic liver disease with nodular contour, lobar redistribution, and fissural widening.  No suspicious lesions seen. Patent portal vein seen. Nonspecific mild edema seen in the gallbladder likely felt related to your chronic liver disease.  No gallstones seen.  No bile duct dilation. Spleen enlarged up to 17 cm with some Gamna-Patricia bodies seen. Pancreas, adrenal glands, and kidneys appear normal. Possible circumferential wall thickening seen of the ascending colon and nonspecific and may be related due to portal colopathy from the liver disease. Perigastric and perisplenic varices seen.  Probable splenorenal shunt seen.  Portal vein, splenic vein, and SMV vein. They do see moderate ascites still present so we need to keep working on the diuretics for that as we discussed. Overall no suspicious lesions seen. Called pt re the mri report and left brief vmail and send via [facility] portal message.  He is on a low salt diet.  In 2019 he had ascites and had an u/s in nov 2019 that showed trace ascites and cirrhosis, splenomegaly no lesion noted.  December 3, 2021 ultrasound shows the liver to be increased/coarsened echotexture and nodular contour compatible with cirrhosis. There is a 9 x 6 x 10 mm subcapsular hypoechoic focus seen along the medial margin of the liver that they favored to represent trace perihepatic fluid. Previously measured hyperechoic focus in the liver was not confidently seen. No bile duct dilation seen and common bile duct 4.8 mm. Gallbladder unremarkable. Right kidney 10.9 cm and left kidney 10.9 cm. No hydronephrosis. Spleen enlarged at 15.7 cm. Moderate to large volume perihepatic ascites seen. May want to consider MRI to further assess the fluid issues.  Dec 10 2021 chol 151 and hdl 48 and trg 100 and ldl 83,  IGG 2414 and  and alb 3.0 and tb 1.3 and db 0.3 and alk 72 and ast 36 and alt 16.   Nov 2 2021 fell down 16 flights and he went to work, Was slippery and gell alone. Had ecchymosis. IGG 2169 and igm 286. na 142 and k 4.2 and cl 108 and co2 28 and wbc 3.1 hg 10.8 and plat 114. mcv 86.2. afp 2.5 and psa 0.71.a1c 5.7%. iron 30%.  January 27 2021 colonoscopy with Dr. Melchor notes a diminutive polyp in the descending colon there was removed with cold forceps a diminutive polyp in the transverse colon that was removed with cold forcep scattered diverticuli seen in the left colon nonbleeding rectal varices and prior hemorrhoid surgery scar was seen.  January 27 2021 EGD showed post variceal banding scar lower third esophagus no appreciable ascites single greater than 50 mm sessile polyps in the gastric antrum and biopsies were taken for histology. A repeat EGD was recommended for 1 year. Gastric biopsy came back inflammatory type polyp with foveolar hyperplasia and granulation tissue. The transverse colon and ascending colon polyps were tubular adenomas.  He was to do april egd with DR Melchor and to do end of august 2022.  Jan 20 mri: Morphologic changes of chronic liver disease and stigmata of portal hypertension with abdominal varices and splenomegaly. No HCC seen. No liver fat or iron. Morphologic changes of chronic liver disease including nodular contour, lobar redistribution, fissural widening, and diffuse background of delayed reticular enhancement compatible with fibrosis. Portal vein is patent. Spleen is enlarged measuring up to 17.8. Pancreas and kidneys normal. Small perigastric and perisplenic varices are present. Small volume perihepatic ascites.  Dec 2020 u.s: cirrhotic appearing liver. Small 5x8x6 mm lesion seen in right lobe and indeterminate. They recommend mri.  Liver patent vessels.  Spleen enlarged. Trace ascites. Spleen 16cm.  Right kidney 11.1cm. Pancreas normal. Common bile duct 3.4mm.   Sept 20202 labs: alb 3.3 and tb 0.8 and db 0.2 and alk 79 and ast 35 and alt 21 and ideal  less than 35.  wbc 2.4 little low and hg low 9.9 and mcv 85.8.  Platelts clumped. crp 2.4. afp normal 2.2 and psa 1.0. a1c 6.9%.   Sept 2020: U.s: they see signs of cirrhosis, with signs of portal hypertension. Small volume ascites and splenomegaly.  No suspicious liver lesions. Patent vessels. Spleen 16cm enlarged.  Right kidney 11.6cm. Pancreas normal. No stones in gallbladder. No liver lesions.   2-4-20 Dr Melchor did egd: post variceal scar in lower esophagus. 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 single 15mm polyp in gastric antrum with adjacent scar from prior incomplete polypectomy. Removed hot snare. two 4-8mm sessile polyps in gastric bdy, Hot snare.  He has not had one since and to do Dec 16 2020 with Dr Melchor and he will do colon also.  His last colonoscopy was in December 2019 there was a fair prep.  He had a few polyps which were removed.    path: Pathologic Diagnosis STOMACH, POLYPS, POLYPECTOMY: HYPERPLASTIC POLYP (FRAGMENTS OF) WITH INFLAMMATORY CHANGE, SUPERFICIAL EROSION, GRANULATION TISSUE FORMATION AND CHANGES SUGGESTIVE OF MUCOSAL PROLAPSE.   ag Electronically Signed by Aramis Silverio MD, PhD, Pathologist Location: [facility]  1-28-20 u.s with hepatic cirrhosis and no focal mass seen and splenomegaly seen.  right kidney 12.3cm.  imaged pancreas portions unremarkable. No gallstones. cbd 3mm.  Liver irregular capsule.   3-19-20: ast 32 and alt 18 alk 71 and wbc 2.5 hg 10.2 and plat 78. afp 1.8. psa 1.1.  12-13-19 inr 1.2 and glu 198 and cr 0.65 and na 137 k 4.3 and cl 104 and co2 29 and ca 8.5 and alb 3.4 and tb 0.9 and alk 77 and ast 26 and alt 16.a1c 6.4%.wbc 2.5 hg 11.5 plat 91.  Walking every day still.  Prior he has seen Dr. Abrams. may want to check iron and do that and if low do the iron.  12/13/19 inr 1.2 gluc 198 na 137 cr 0.65 tb 0.9 alp 77 ast 26 alt 16 hemoglobin a1c 6.4% wbc low 2.5 neutrophils 1608 hgb 11.5 plt 91 afp 2.0    Egd 12/3/19  grade 2 varices were banded. single 20mm sessile polyp resection was incomplete due to polypectomy being technically difficult and complex.   Document Type: US Abdomen Complete Document Date: August 23, 2019 17:33  Document Status: Auth (Verified) Document Title: US Abdomen Complete Performed By: Dequan Koch  Verified By: Dequan Koch on August 23, 2019 17:38  Encounter info: [id number], [facility], Single Visit OP, 8/23/2019 - 8/23/2019   * Final Report *  Reason For Exam Liver dz, HTN  DM   Lower extremity edema  REPORT EXAM: US Abdomen Complete  CLINICAL INDICATION: Liver disorder specified;Liver dz, HTN  DM   Lower extremity edema. DM   HTN  Liver DZ  TECHNIQUE: Axial and longitudinal images were obtained of the upper abdomen using real-time ultrasound. [id number]  COMPARISON: Remote MRI of 11/17/2015  FINDINGS:  Liver: Diffuse cirrhotic changes. No lesions. Portal vein is patent, peak velocity approximately 10 cm/s. Hepatic veins are patent.  Bile Ducts: No dilated intrahepatic biliary radicles. Common bile duct measu2.4 mm.  Gallbladder: Normal. Cheney's sign is negative.  Pancreas: Normal.  Right Kidney: Measures 11.6 cm. Normal echogenicity. No hydronephrosis. No focal lesion  Left Kidney: Measures 10.9 cm. Normal echogenicity. No hydronephrosis. No focal lesion  Spleen: Measures: 16.5 cm. Homogeneous appearance.  Aorta: Not well seen  IVC: Normal proximally, not imaged distally.   Other: Small quantities of scattered simple appearing ascitic fluid.  IMPRESSION:    Redemonstrated cirrhotic changes within the liver. No focal lesion identified. Preserved hepatopedal portal flow. Portal hypertension changes as evidenced by small quantities of ascites and splenomegaly.   Signature Line *** Final ***  Electronically Signed By:  Dequan Koch on  08/23/2019 17:38  Dictated by:  Dequan Koch  Sept 13 2019 chol 169 and hdl 61 and trg 52 and ldl 95, alb 3.5 and globulin 3.9 elevated and tb 2.2 and db 0.5 and ib 1.7 and alk 90 and ast 27 and alt 17,  wbc 3.2 hg 11.8 and plat 92 and crp 5.2.  May 2019 wbc 5 hg 8.0 and plat 144 and inr 1.2. alb 3.5 and tb 0.8 and db 0.2 and alk 65 and ast 26 and alt 15.  afp 1.6 and a1c 6.1%.   When he was low he did get the iron back then.  Dr. Arias for an umbilical hernia and hemorrhoidectomy which was done on 11/16/18, with re-expolration on 11/24/18 and on 12/11/18 for large rectal varices noted on colonocopy.  Prior to 2019  had been off the diuretics since July 2018.    4/24/19 labs albumin low 3.5 alt 65 ast 26 alt 15 wbc low 3.2 hgb low 7.8 mcv low 74.4 plt low 103 afp 1.6 psa 1 c-reactive protein 5.4 vit d 22 hdl low 39 hemoglobin a1c 6.1%   12/2/18 na 138 k 4.0 cr 0.73 albumin 3.3 alt 17 ast 31 alp 52 wbc low 4 hgb low 8.5 plt low 140 inr 1.48 tb 1.3 MELD 12  Sept 21 2018 a1c 6.4 and alb 4.2 and tb 0.5 and db 0.2 and alk 65 and ast 32 and alt 22 and tp 7.4, chol 160 and trg 83 and hdl 60 and ldl 83. wbc 2.2 hg 8.7 and plat 88 afp 1.9.   U/s: July 2018: Cirrhosis and no ascites and no masses.  Egd May 2018 no varices noted, gastric polyp noted and removed.  Egd 6/4/18 post variceal banding scar found, no varices noted, one cratered gastric ulcer with no stigmata of bleeding found in gastric antrum. duodenum was normal.

## 2023-03-27 ENCOUNTER — TELEPHONE ENCOUNTER (OUTPATIENT)
Dept: URBAN - METROPOLITAN AREA CLINIC 92 | Facility: CLINIC | Age: 68
End: 2023-03-27

## 2023-03-27 ENCOUNTER — TELEPHONE ENCOUNTER (OUTPATIENT)
Dept: URBAN - METROPOLITAN AREA CLINIC 86 | Facility: CLINIC | Age: 68
End: 2023-03-27

## 2023-03-27 NOTE — HPI-TODAY'S VISIT:
Dear Lucas Marte,  The 3/24/23 labs were sent to us.   The white blood cells 3.7, hemoglobin 13, mcv 104, platelets low at 86. It does not appear Dr. Robledo office ran the kidney function test. We would request you update this. I gave you orders at the office visit and you can take them locally to have done and they can be sent to us. If there are any issues call [phone], extension [phone].  Delma Gonzales PA-C

## 2023-03-31 ENCOUNTER — OFFICE VISIT (OUTPATIENT)
Dept: URBAN - METROPOLITAN AREA CLINIC 86 | Facility: CLINIC | Age: 68
End: 2023-03-31
Payer: MEDICARE

## 2023-03-31 VITALS
BODY MASS INDEX: 26.96 KG/M2 | SYSTOLIC BLOOD PRESSURE: 125 MMHG | DIASTOLIC BLOOD PRESSURE: 60 MMHG | WEIGHT: 182 LBS | TEMPERATURE: 96.9 F | HEIGHT: 69 IN | HEART RATE: 75 BPM

## 2023-03-31 DIAGNOSIS — Z71.89 VACCINE COUNSELING: ICD-10-CM

## 2023-03-31 DIAGNOSIS — D69.6 THROMBOCYTOPENIA: ICD-10-CM

## 2023-03-31 DIAGNOSIS — R18.8 OTHER ASCITES: ICD-10-CM

## 2023-03-31 DIAGNOSIS — R93.5 ABNORMAL ULTRASOUND OF ABDOMEN: ICD-10-CM

## 2023-03-31 DIAGNOSIS — E80.4 GILBERT SYNDROME: ICD-10-CM

## 2023-03-31 DIAGNOSIS — Z79.899 HIGH RISK MEDICATION USE: ICD-10-CM

## 2023-03-31 DIAGNOSIS — E11.9 DIABETES: ICD-10-CM

## 2023-03-31 DIAGNOSIS — K76.89 LIVER LESION: ICD-10-CM

## 2023-03-31 DIAGNOSIS — I85.00 ESOPHAGEAL VARICES WITHOUT BLEEDING: ICD-10-CM

## 2023-03-31 DIAGNOSIS — E66.3 OVERWEIGHT: ICD-10-CM

## 2023-03-31 DIAGNOSIS — K75.4 AUTOIMMUNE HEPATITIS: ICD-10-CM

## 2023-03-31 DIAGNOSIS — R16.1 SPLENOMEGALY: ICD-10-CM

## 2023-03-31 PROCEDURE — 99215 OFFICE O/P EST HI 40 MIN: CPT

## 2023-03-31 RX ORDER — FUROSEMIDE 20 MG/1
1 TABLET TABLET ORAL ONCE A DAY
Qty: 30 | Refills: 3 | Status: ACTIVE | COMMUNITY

## 2023-03-31 RX ORDER — SPIRONOLACTONE 50 MG/1
1 TABLET TABLET, FILM COATED ORAL ONCE A DAY
Qty: 30 | Refills: 3 | Status: ACTIVE | COMMUNITY

## 2023-03-31 RX ORDER — PANTOPRAZOLE SODIUM 40 MG
TAKE 1 TABLET (40 MG) BY ORAL ROUTE ONCE DAILY TABLET, DELAYED RELEASE (ENTERIC COATED) ORAL 1
Qty: 0 | Refills: 0 | Status: ACTIVE | COMMUNITY
Start: 1900-01-01

## 2023-03-31 RX ORDER — INSULIN GLARGINE 100 [IU]/ML
30 UNITS SQ INJECT BY SUBCUTANEOUS ROUTE 1X INJECTION, SOLUTION SUBCUTANEOUS 1
Refills: 0 | Status: ACTIVE | COMMUNITY
Start: 1900-01-01

## 2023-03-31 RX ORDER — URSODIOL 300 MG/1
TAKE 1 CAPSULE BY ORAL ROUTE TID TAKE WITH FOOD CAPSULE ORAL
Qty: 90 | Refills: 1 | Status: ACTIVE | COMMUNITY

## 2023-03-31 NOTE — EXAM-PHYSICAL EXAM
Gen: awake and responsive. Mild bitemporal wasting. Eyes: anicteric, normal lids. Mouth: dry lips. No thrush.  Nose: no drainage. Hearing: intact grossly. Neck: trachea midline and no jvd. CV: RRR no s3. Lungs: clear. No wheezes, Abd: Soft, nabs, nr, NT. Mod protuberant and clear fluid wave, umb hernia mildly protuberant. Spleen tip not palpable. Some excoriation from scratching. Ext: minimal edema, some palm erythema. Neuro: moves all 4 ext grossly. No asterixis. Skin: no pruritis and some palm erythema.

## 2023-03-31 NOTE — HPI-TODAY'S VISIT:
Pt is a 66 year old Other Race male, after a previous visit March 2023 with Delma for follow-up evaluation of Autoimmune Hepatitis and phtn complications and hx rectal bleeding.   3/24/23 labs were sent to us.  The white blood cells 3.7, hemoglobin 13, mcv 104, platelets low at 86. It does not appear Dr. Robledo office ran the kidney function test.   Pt says he has gained some weight and not feeling like fluid.  3/24/23  Patient was recently hospitalized due to GI bleed and underwent antegrade obliteration of varices using Sclerosis and embolization.  He was seen hematology.  He had a colonoscopy and EGD while in the hospital.  He had large bleeding rectal varices and a 5 mm polyp in the sigmoid colon.  He underwent paracentesis by IR with 4 L removed.  He is pending IR evaluation for possible prophylactic treatment of large perigastric collaterals with may be BRTO/CARTI 1. No mention of tips as primary due to risk for pse.   He had a CT scan while in the hospital showing a nonocclusive clot and given the bleed options for anticoagulation were limited.    Also his MELD score is only 16 and so he would not be higher priority for transplant.    He says that in a week of the hospital the ascites.   He is taking the water pills lasix qd and aldactone qd.  Not losing weight.  Weight 183 and when left hospital 177.  3/17 He had paracentesis last with and 6 L removed.   Delma said was prior on the aldactone 2 times a week and we will increase this.   recap Pt says he taking the lasix alone.  Not taking the aldactone.  Discussed need for this.  Labs from Dr. Abrams dated July 2022 show a haptoglobin low at 13.  Reticulocyte count normal at 2%.  June labs show Gammaglobulin up at 2.2 with no M spike.  Erythropoietin level elevated at 41.2.  Methylmalonic acid normal at 84.  Haptoglobin 921.  C-reactive protein of 2.  Need to get some labs to see how doing. he sees next week. He is giving more iron to pt.  4-8-2022  nuetrophil 75% lymphs 13%.  crp 5.1, afp 3.1. psa 0.49,  a1c7.1%. chol 152 hdl 54 trg 87 and ldl 81.  alb 3.2 tb 1.6 db 0.4, tb 1.2 alk 72, ast 32 and alt 18.  wbc 3.3 hg 10.0, hct 31, plat 110. mcv 88.6.  neut 2475 and lymphs 429.   January 24 ultrasound done was a limited ascites assessment.  These are specifically no significant free fluid in the abdomen and so no paracentesis were performed.  This was done as mri suggested mod ascites.  He had started daily Lasix and Aldactone daily basis and worked for him.  He says breast tenderness is minimal.  January 4 MRI back. Lower thorax where seen appeared to be normal. Liver showed no fat or iron.  They do see changes of chronic liver disease with nodular contour, lobar redistribution, and fissural widening.  No suspicious lesions seen. Patent portal vein seen. Nonspecific mild edema seen in the gallbladder likely felt related to your chronic liver disease.  No gallstones seen.  No bile duct dilation. Spleen enlarged up to 17 cm with some Gamna-Patricia bodies seen. Pancreas, adrenal glands, and kidneys appear normal. Possible circumferential wall thickening seen of the ascending colon and nonspecific and may be related due to portal colopathy from the liver disease. Perigastric and perisplenic varices seen.  Probable splenorenal shunt seen.  Portal vein, splenic vein, and SMV vein. They do see moderate ascites still present so we need to keep working on the diuretics for that as we discussed. Overall no suspicious lesions seen. Called pt re the mri report and left brief vmail and send via [employer] portal message.  He is on a low salt diet.  In 2019 he had ascites and had an u/s in nov 2019 that showed trace ascites and cirrhosis, splenomegaly no lesion noted.  December 3, 2021 ultrasound shows the liver to be increased/coarsened echotexture and nodular contour compatible with cirrhosis. There is a 9 x 6 x 10 mm subcapsular hypoechoic focus seen along the medial margin of the liver that they favored to represent trace perihepatic fluid. Previously measured hyperechoic focus in the liver was not confidently seen. No bile duct dilation seen and common bile duct 4.8 mm. Gallbladder unremarkable. Right kidney 10.9 cm and left kidney 10.9 cm. No hydronephrosis. Spleen enlarged at 15.7 cm. Moderate to large volume perihepatic ascites seen. May want to consider MRI to further assess the fluid issues.  Dec 10 2021 chol 151 and hdl 48 and trg 100 and ldl 83,  IGG 2414 and  and alb 3.0 and tb 1.3 and db 0.3 and alk 72 and ast 36 and alt 16.   Nov 2 2021 fell down 16 flights and he went to work, Was slippery and gell alone. Had ecchymosis. IGG 2169 and igm 286. na 142 and k 4.2 and cl 108 and co2 28 and wbc 3.1 hg 10.8 and plat 114. mcv 86.2. afp 2.5 and psa 0.71.a1c 5.7%. iron 30%.  January 27 2021 colonoscopy with Dr. Melchor notes a diminutive polyp in the descending colon there was removed with cold forceps a diminutive polyp in the transverse colon that was removed with cold forcep scattered diverticuli seen in the left colon nonbleeding rectal varices and prior hemorrhoid surgery scar was seen.  January 27 2021 EGD showed post variceal banding scar lower third esophagus no appreciable ascites single greater than 50 mm sessile polyps in the gastric antrum and biopsies were taken for histology. A repeat EGD was recommended for 1 year. Gastric biopsy came back inflammatory type polyp with foveolar hyperplasia and granulation tissue. The transverse colon and ascending colon polyps were tubular adenomas.  He was to do april egd with DR Melchor and to do end of august 2022.  Jan 20 mri: Morphologic changes of chronic liver disease and stigmata of portal hypertension with abdominal varices and splenomegaly. No HCC seen. No liver fat or iron. Morphologic changes of chronic liver disease including nodular contour, lobar redistribution, fissural widening, and diffuse background of delayed reticular enhancement compatible with fibrosis. Portal vein is patent. Spleen is enlarged measuring up to 17.8. Pancreas and kidneys normal. Small perigastric and perisplenic varices are present. Small volume perihepatic ascites.  Dec 2020 u.s: cirrhotic appearing liver. Small 5x8x6 mm lesion seen in right lobe and indeterminate. They recommend mri.  Liver patent vessels.  Spleen enlarged. Trace ascites. Spleen 16cm.  Right kidney 11.1cm. Pancreas normal. Common bile duct 3.4mm.   Sept 20202 labs: alb 3.3 and tb 0.8 and db 0.2 and alk 79 and ast 35 and alt 21 and ideal  less than 35.  wbc 2.4 little low and hg low 9.9 and mcv 85.8.  Platelts clumped. crp 2.4. afp normal 2.2 and psa 1.0. a1c 6.9%.   Sept 2020: U.s: they see signs of cirrhosis, with signs of portal hypertension. Small volume ascites and splenomegaly.  No suspicious liver lesions. Patent vessels. Spleen 16cm enlarged.  Right kidney 11.6cm. Pancreas normal. No stones in gallbladder. No liver lesions.   2-4-20 Dr Melchor did egd: post variceal scar in lower esophagus. 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 single 15mm polyp in gastric antrum with adjacent scar from prior incomplete polypectomy. Removed hot snare. two 4-8mm sessile polyps in gastric bdy, Hot snare.  He has not had one since and to do Dec 16 2020 with Dr Melchor and he will do colon also.  His last colonoscopy was in December 2019 there was a fair prep.  He had a few polyps which were removed.    path: Pathologic Diagnosis STOMACH, POLYPS, POLYPECTOMY: HYPERPLASTIC POLYP (FRAGMENTS OF) WITH INFLAMMATORY CHANGE, SUPERFICIAL EROSION, GRANULATION TISSUE FORMATION AND CHANGES SUGGESTIVE OF MUCOSAL PROLAPSE.   ag Electronically Signed by Aramis Silverio MD, PhD, Pathologist Location: [facility]  1-28-20 u.s with hepatic cirrhosis and no focal mass seen and splenomegaly seen.  right kidney 12.3cm.  imaged pancreas portions unremarkable. No gallstones. cbd 3mm.  Liver irregular capsule.   3-19-20: ast 32 and alt 18 alk 71 and wbc 2.5 hg 10.2 and plat 78. afp 1.8. psa 1.1.  12-13-19 inr 1.2 and glu 198 and cr 0.65 and na 137 k 4.3 and cl 104 and co2 29 and ca 8.5 and alb 3.4 and tb 0.9 and alk 77 and ast 26 and alt 16.a1c 6.4%.wbc 2.5 hg 11.5 plat 91.  Walking every day still.  Prior he has seen Dr. Abrams. may want to check iron and do that and if low do the iron.  12/13/19 inr 1.2 gluc 198 na 137 cr 0.65 tb 0.9 alp 77 ast 26 alt 16 hemoglobin a1c 6.4% wbc low 2.5 neutrophils 1608 hgb 11.5 plt 91 afp 2.0    Egd 12/3/19  grade 2 varices were banded. single 20mm sessile polyp resection was incomplete due to polypectomy being technically difficult and complex.   Document Type: US Abdomen Complete Document Date: August 23, 2019 17:33  Document Status: Auth (Verified) Document Title: US Abdomen Complete Performed By: Dequan Koch  Verified By: Dequan Koch on August 23, 2019 17:38  Encounter info: [id number], [facility], Single Visit OP, 8/23/2019 - 8/23/2019   * Final Report *  Reason For Exam Liver dz, HTN  DM   Lower extremity edema  REPORT EXAM: US Abdomen Complete  CLINICAL INDICATION: Liver disorder specified;Liver dz, HTN  DM   Lower extremity edema. DM   HTN  Liver DZ  TECHNIQUE: Axial and longitudinal images were obtained of the upper abdomen using real-time ultrasound. [id number]  COMPARISON: Remote MRI of 11/17/2015  FINDINGS:  Liver: Diffuse cirrhotic changes. No lesions. Portal vein is patent, peak velocity approximately 10 cm/s. Hepatic veins are patent.  Bile Ducts: No dilated intrahepatic biliary radicles. Common bile duct measu2.4 mm.  Gallbladder: Normal. Cheney's sign is negative.  Pancreas: Normal.  Right Kidney: Measures 11.6 cm. Normal echogenicity. No hydronephrosis. No focal lesion  Left Kidney: Measures 10.9 cm. Normal echogenicity. No hydronephrosis. No focal lesion  Spleen: Measures: 16.5 cm. Homogeneous appearance.  Aorta: Not well seen  IVC: Normal proximally, not imaged distally.   Other: Small quantities of scattered simple appearing ascitic fluid.  IMPRESSION:    Redemonstrated cirrhotic changes within the liver. No focal lesion identified. Preserved hepatopedal portal flow. Portal hypertension changes as evidenced by small quantities of ascites and splenomegaly.   Signature Line *** Final ***  Electronically Signed By:  Dequan Koch on  08/23/2019 17:38  Dictated by:  Dequan Koch  Sept 13 2019 chol 169 and hdl 61 and trg 52 and ldl 95, alb 3.5 and globulin 3.9 elevated and tb 2.2 and db 0.5 and ib 1.7 and alk 90 and ast 27 and alt 17,  wbc 3.2 hg 11.8 and plat 92 and crp 5.2.  May 2019 wbc 5 hg 8.0 and plat 144 and inr 1.2. alb 3.5 and tb 0.8 and db 0.2 and alk 65 and ast 26 and alt 15.  afp 1.6 and a1c 6.1%.   When he was low he did get the iron back then.  Dr. Arias for an umbilical hernia and hemorrhoidectomy which was done on 11/16/18, with re-expolration on 11/24/18 and on 12/11/18 for large rectal varices noted on colonocopy.  Prior to 2019  had been off the diuretics since July 2018.    4/24/19 labs albumin low 3.5 alt 65 ast 26 alt 15 wbc low 3.2 hgb low 7.8 mcv low 74.4 plt low 103 afp 1.6 psa 1 c-reactive protein 5.4 vit d 22 hdl low 39 hemoglobin a1c 6.1%   12/2/18 na 138 k 4.0 cr 0.73 albumin 3.3 alt 17 ast 31 alp 52 wbc low 4 hgb low 8.5 plt low 140 inr 1.48 tb 1.3 MELD 12 Sept 21 2018 a1c 6.4 and alb 4.2 and tb 0.5 and db 0.2 and alk 65 and ast 32 and alt 22 and tp 7.4, chol 160 and trg 83 and hdl 60 and ldl 83. wbc 2.2 hg 8.7 and plat 88 afp 1.9.   U/s: July 2018: Cirrhosis and no ascites and no masses.  Egd May 2018 no varices noted, gastric polyp noted and removed.  Egd 6/4/18 post variceal banding scar found, no varices noted, one cratered gastric ulcer with no stigmata of bleeding found in gastric antrum. duodenum was normal.  Plan: 1. do labs now and check meld and pending. Preop center. 2. Pt was reninforced not getting a tips but brto style eval. 3. No rectal bleeding. 4. Pt taking lactulose. 5. Has tap april 4 2023.  Duration of the visit was 45 min with 10 min of chart prep and 35 minutes for this face to face visit with time reviewing their prior and recent records and labs and discussing their current status and future plans for care for the patient. SPoke with pt and son.

## 2023-04-07 ENCOUNTER — TELEPHONE ENCOUNTER (OUTPATIENT)
Dept: URBAN - METROPOLITAN AREA CLINIC 86 | Facility: CLINIC | Age: 68
End: 2023-04-07

## 2023-04-07 ENCOUNTER — OFFICE VISIT (OUTPATIENT)
Dept: URBAN - METROPOLITAN AREA CLINIC 86 | Facility: CLINIC | Age: 68
End: 2023-04-07

## 2023-04-07 RX ORDER — URSODIOL 300 MG/1
TAKE 1 CAPSULE BY ORAL ROUTE TID TAKE WITH FOOD CAPSULE ORAL
Qty: 90 | Refills: 1 | COMMUNITY

## 2023-04-07 RX ORDER — FUROSEMIDE 20 MG/1
1 TABLET TABLET ORAL ONCE A DAY
Qty: 30 | Refills: 3 | COMMUNITY

## 2023-04-07 RX ORDER — PANTOPRAZOLE SODIUM 40 MG
TAKE 1 TABLET (40 MG) BY ORAL ROUTE ONCE DAILY TABLET, DELAYED RELEASE (ENTERIC COATED) ORAL 1
Qty: 0 | Refills: 0 | COMMUNITY
Start: 1900-01-01

## 2023-04-07 RX ORDER — INSULIN GLARGINE 100 [IU]/ML
30 UNITS SQ INJECT BY SUBCUTANEOUS ROUTE 1X INJECTION, SOLUTION SUBCUTANEOUS 1
Refills: 0 | COMMUNITY
Start: 1900-01-01

## 2023-04-07 RX ORDER — SPIRONOLACTONE 50 MG/1
1 TABLET TABLET, FILM COATED ORAL ONCE A DAY
Qty: 30 | Refills: 3 | COMMUNITY

## 2023-04-07 NOTE — HPI-TODAY'S VISIT:
Dear Dr Lucas Marte, April 7 had 6.7 liter tap. He says he feels better now post tap. He was asked weight had been stable on the diuretics pre tap. He will let us know he is doing. Dr Albarran

## 2023-04-08 ENCOUNTER — TELEPHONE ENCOUNTER (OUTPATIENT)
Dept: URBAN - METROPOLITAN AREA CLINIC 86 | Facility: CLINIC | Age: 68
End: 2023-04-08

## 2023-04-08 NOTE — HPI-TODAY'S VISIT:
Dear Dr. Lucas Marte, April 7 tap: low total protein seen which is typical for cirrhosis driven ascites. Albumin also low as expected.  so not infected. Called pt to notify of fluid studies. Left vmail. Dr Albarran Addendum: 4-9: fluid culture negative. Dr Albarran

## 2023-04-10 ENCOUNTER — LAB OUTSIDE AN ENCOUNTER (OUTPATIENT)
Dept: URBAN - METROPOLITAN AREA CLINIC 86 | Facility: CLINIC | Age: 68
End: 2023-04-10

## 2023-04-12 ENCOUNTER — OFFICE VISIT (OUTPATIENT)
Dept: URBAN - METROPOLITAN AREA CLINIC 86 | Facility: CLINIC | Age: 68
End: 2023-04-12

## 2023-06-16 ENCOUNTER — LAB OUTSIDE AN ENCOUNTER (OUTPATIENT)
Dept: URBAN - METROPOLITAN AREA CLINIC 86 | Facility: CLINIC | Age: 68
End: 2023-06-16

## 2023-06-16 ENCOUNTER — OFFICE VISIT (OUTPATIENT)
Dept: URBAN - METROPOLITAN AREA CLINIC 86 | Facility: CLINIC | Age: 68
End: 2023-06-16
Payer: MEDICARE

## 2023-06-16 VITALS
SYSTOLIC BLOOD PRESSURE: 107 MMHG | TEMPERATURE: 97.1 F | WEIGHT: 169 LBS | HEART RATE: 75 BPM | DIASTOLIC BLOOD PRESSURE: 62 MMHG | HEIGHT: 69 IN | BODY MASS INDEX: 25.03 KG/M2

## 2023-06-16 DIAGNOSIS — I85.00 ESOPHAGEAL VARICES WITHOUT BLEEDING: ICD-10-CM

## 2023-06-16 DIAGNOSIS — R16.1 SPLENOMEGALY: ICD-10-CM

## 2023-06-16 DIAGNOSIS — E11.9 DIABETES: ICD-10-CM

## 2023-06-16 DIAGNOSIS — R18.8 OTHER ASCITES: ICD-10-CM

## 2023-06-16 DIAGNOSIS — D69.6 THROMBOCYTOPENIA: ICD-10-CM

## 2023-06-16 DIAGNOSIS — Z79.899 HIGH RISK MEDICATION USE: ICD-10-CM

## 2023-06-16 DIAGNOSIS — K75.4 AUTOIMMUNE HEPATITIS: ICD-10-CM

## 2023-06-16 DIAGNOSIS — R93.5 ABNORMAL ULTRASOUND OF ABDOMEN: ICD-10-CM

## 2023-06-16 DIAGNOSIS — K76.89 LIVER LESION: ICD-10-CM

## 2023-06-16 DIAGNOSIS — Z71.89 VACCINE COUNSELING: ICD-10-CM

## 2023-06-16 DIAGNOSIS — E66.3 OVERWEIGHT: ICD-10-CM

## 2023-06-16 DIAGNOSIS — E80.4 GILBERT SYNDROME: ICD-10-CM

## 2023-06-16 PROCEDURE — 99215 OFFICE O/P EST HI 40 MIN: CPT

## 2023-06-16 RX ORDER — FUROSEMIDE 20 MG/1
1 TABLET TABLET ORAL ONCE A DAY
Qty: 30 | Refills: 3 | Status: ACTIVE | COMMUNITY

## 2023-06-16 RX ORDER — INSULIN GLARGINE 100 [IU]/ML
30 UNITS SQ INJECT BY SUBCUTANEOUS ROUTE 1X INJECTION, SOLUTION SUBCUTANEOUS 1
Refills: 0 | Status: ACTIVE | COMMUNITY
Start: 1900-01-01

## 2023-06-16 RX ORDER — URSODIOL 300 MG/1
TAKE 1 CAPSULE BY ORAL ROUTE TID TAKE WITH FOOD CAPSULE ORAL
Qty: 90 | Refills: 1 | Status: ACTIVE | COMMUNITY

## 2023-06-16 RX ORDER — SPIRONOLACTONE 50 MG/1
1 TABLET TABLET, FILM COATED ORAL ONCE A DAY
Qty: 30 | Refills: 3 | Status: ACTIVE | COMMUNITY

## 2023-06-16 RX ORDER — PANTOPRAZOLE SODIUM 40 MG
TAKE 1 TABLET (40 MG) BY ORAL ROUTE ONCE DAILY TABLET, DELAYED RELEASE (ENTERIC COATED) ORAL 1
Qty: 0 | Refills: 0 | Status: ACTIVE | COMMUNITY
Start: 1900-01-01

## 2023-06-16 NOTE — HPI-TODAY'S VISIT:
Pt is a 67 year old Other Race male, after a previous visit March 2023 for follow-up evaluation of Autoimmune Hepatitis and phtn complications of ascites and hx rectal bleeding.  He has not done the tips procedure done. He is trying to work.  He met with IR team to discuss the options on tips.  He says has not seen oltx.  Maybe best to have the oltx seem and help in the planning of the possible tips if worsens.  Last tap was over a month like 6 weeks.  He is low salt diet and it has worked it.  He is taking Lasix 20mg and aldactone 50mg po qd.  He is afraid of tips and its potential to cause pse and he wants to continue to work.  Wife started chemotherapy and dealing with her illness and his illness and his ill. Wife is stage 4,  June 9 2023 chol 162 and hdl 45 and trg 85 and ldl 99 and glu 150 and bun 18 and cr 0.67 and na 132 and k 4.3 and cl 99 and co2 28 and ca 8.1 little low and alb 2.6 and tb 2.7 and alk 88 and ast30 and alt 18  a1c down 5.6%.  inr 1.4 and wbc 5 hg 12.2 and plat 126.  crp 27.9 and psa 0.16. Meld 14 and meld na 18.   April 7 tap: low total protein seen which is typical for cirrhosis driven ascites. Albumin also low as expected.  so not infected. April 7 had 6.7 liter tap. He says he feels better now post tap. He was asked weight had been stable on the diuretics pre tap.   3/24/23 labs were sent to us.  The white blood cells 3.7, hemoglobin 13, mcv 104, platelets low at 86. It does not appear Dr. Robledo office ran the kidney function test.   3/24/23  Patient was recently hospitalized due to GI bleed and underwent antegrade obliteration of varices using Sclerosis and embolization.  He was seen hematology.  He had a colonoscopy and EGD while in the hospital.  He had large bleeding rectal varices and a 5 mm polyp in the sigmoid colon.  He underwent paracentesis by IR with 4 L removed.  He is pending IR evaluation for possible prophylactic treatment of large perigastric collaterals with may be ARTTO/JODY 1. No mention of tips as primary due to risk for pse.   He had a CT scan while in the hospital showing a nonocclusive clot and given the bleed options for anticoagulation were limited.    Also his MELD score is only 16 and so he would not be higher priority for transplant.    He says that in a week of the hospital the ascites.   He is taking the water pills lasix qd and aldactone qd.  Weight to 170.5 and last time  183 and when left hospital 177.  3/17 He had paracentesis last with and 6 L removed.   January 4 MRI back. Lower thorax where seen appeared to be normal. Liver showed no fat or iron.  They do see changes of chronic liver disease with nodular contour, lobar redistribution, and fissural widening.  No suspicious lesions seen. Patent portal vein seen. Nonspecific mild edema seen in the gallbladder likely felt related to your chronic liver disease.  No gallstones seen.  No bile duct dilation. Spleen enlarged up to 17 cm with some Gamna-[place] bodies seen. Pancreas, adrenal glands, and kidneys appear normal. Possible circumferential wall thickening seen of the ascending colon and nonspecific and may be related due to portal colopathy from the liver disease. Perigastric and perisplenic varices seen.  Probable splenorenal shunt seen.  Portal vein, splenic vein, and SMV vein. They do see moderate ascites still present so we need to keep working on the diuretics for that as we discussed. Overall no suspicious lesions seen. Called pt re the mri report and left brief vmail and send via [employer] portal message.  He is on a low salt diet.  In 2019 he had ascites and had an u/s in nov 2019 that showed trace ascites and cirrhosis, splenomegaly no lesion noted.  December 3, 2021 ultrasound shows the liver to be increased/coarsened echotexture and nodular contour compatible with cirrhosis. There is a 9 x 6 x 10 mm subcapsular hypoechoic focus seen along the medial margin of the liver that they favored to represent trace perihepatic fluid. Previously measured hyperechoic focus in the liver was not confidently seen. No bile duct dilation seen and common bile duct 4.8 mm. Gallbladder unremarkable. Right kidney 10.9 cm and left kidney 10.9 cm. No hydronephrosis. Spleen enlarged at 15.7 cm. Moderate to large volume perihepatic ascites seen. May want to consider MRI to further assess the fluid issues.  January 27 2021 colonoscopy with Dr. Melchor notes a diminutive polyp in the descending colon there was removed with cold forceps a diminutive polyp in the transverse colon that was removed with cold forcep scattered diverticuli seen in the left colon nonbleeding rectal varices and prior hemorrhoid surgery scar was seen.  January 27 2021 EGD showed post variceal banding scar lower third esophagus no appreciable ascites single greater than 50 mm sessile polyps in the gastric antrum and biopsies were taken for histology. A repeat EGD was recommended for 1 year. Gastric biopsy came back inflammatory type polyp with foveolar hyperplasia and granulation tissue. The transverse colon and ascending colon polyps were tubular adenomas.  He was to do april egd with DR Melchor and to do end of august 2022.  Jan 20 mri: Morphologic changes of chronic liver disease and stigmata of portal hypertension with abdominal varices and splenomegaly. No HCC seen. No liver fat or iron. Morphologic changes of chronic liver disease including nodular contour, lobar redistribution, fissural widening, and diffuse background of delayed reticular enhancement compatible with fibrosis. Portal vein is patent. Spleen is enlarged measuring up to 17.8. Pancreas and kidneys normal. Small perigastric and perisplenic varices are present. Small volume perihepatic ascites.  Dec 2020 u.s: cirrhotic appearing liver. Small 5x8x6 mm lesion seen in right lobe and indeterminate. They recommend mri.  Liver patent vessels.  Spleen enlarged. Trace ascites. Spleen 16cm.  Right kidney 11.1cm. Pancreas normal. Common bile duct 3.4mm.   Sept 2020: U.s: they see signs of cirrhosis, with signs of portal hypertension. Small volume ascites and splenomegaly.  No suspicious liver lesions. Patent vessels. Spleen 16cm enlarged.  Right kidney 11.6cm. Pancreas normal. No stones in gallbladder. No liver lesions.  2-4-20 Dr Melchor did egd: post variceal scar in lower esophagus. 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 single 15mm polyp in gastric antrum with adjacent scar from prior incomplete polypectomy. Removed hot snare. two 4-8mm sessile polyps in gastric bdy, Hot snare.  He has not had one since and to do Dec 16 2020 with Dr Melchor and he will do colon also.  His last colonoscopy was in December 2019 there was a fair prep.  He had a few polyps which were removed.    path: Pathologic Diagnosis STOMACH, POLYPS, POLYPECTOMY: HYPERPLASTIC POLYP (FRAGMENTS OF) WITH INFLAMMATORY CHANGE, SUPERFICIAL EROSION, GRANULATION TISSUE FORMATION AND CHANGES SUGGESTIVE OF MUCOSAL PROLAPSE.   ag Electronically Signed by Aramis Silverio MD, PhD, Pathologist Location: [facility]  1-28-20 u.s with hepatic cirrhosis and no focal mass seen and splenomegaly seen.  right kidney 12.3cm.  imaged pancreas portions unremarkable. No gallstones. cbd 3mm.  Liver irregular capsule.   Egd 12/3/19  grade 2 varices were banded. single 20mm sessile polyp resection was incomplete due to polypectomy being technically difficult and complex.   Document Type: US Abdomen Complete Document Date: August 23, 2019 17:33   Reason For Exam Liver dz, HTN  DM   Lower extremity edema  REPORT EXAM: US Abdomen Complete  CLINICAL INDICATION: Liver disorder specified;Liver dz, HTN  DM   Lower extremity edema. DM   HTN  Liver DZ  TECHNIQUE: Axial and longitudinal images were obtained of the upper abdomen using real-time ultrasound. [id number]  COMPARISON: Remote MRI of 11/17/2015  FINDINGS:  Liver: Diffuse cirrhotic changes. No lesions. Portal vein is patent, peak velocity approximately 10 cm/s. Hepatic veins are patent.  Bile Ducts: No dilated intrahepatic biliary radicles. Common bile duct measu2.4 mm.  Gallbladder: Normal. Cheney's sign is negative.  Pancreas: Normal.  Right Kidney: Measures 11.6 cm. Normal echogenicity. No hydronephrosis. No focal lesion  Left Kidney: Measures 10.9 cm. Normal echogenicity. No hydronephrosis. No focal lesion  Spleen: Measures: 16.5 cm. Homogeneous appearance.  Aorta: Not well seen  IVC: Normal proximally, not imaged distally.   Other: Small quantities of scattered simple appearing ascitic fluid.  IMPRESSION:    Redemonstrated cirrhotic changes within the liver. No focal lesion identified. Preserved hepatopedal portal flow. Portal hypertension changes as evidenced by small quantities of ascites and splenomegaly.  Plan: 1. Paracentesis now and to do the u.s with doppler to see liver. 2. Stay on the diuretics. 3. Plan to see in 8 weeks., 4. He wants to see oltx as meld above 15 and understands that is off a bit but that if we can control ascites without tips would be good but also if a tips is needed then would be good to be on the list.   Duration of the visit was 45 minutes with 10 minutes of chart prep and 35 minutes of face to face visit reviewing recent records current status and future plans for the patient.

## 2023-06-16 NOTE — EXAM-PHYSICAL EXAM
Gen: awake and responsive. Mild bitemporal wasting. Eyes: anicteric, normal lids. Mouth: dry lips. No thrush.  Nose: no drainage. Hearing: intact grossly. Neck: trachea midline and no jvd. CV: RRR no s3. Lungs: clear. No wheezes, Abd: Soft, nabs, nr, NT. Mod ascites. Has umb hernia mildly protuberant. Spleen tip not palpable.  Ext: no sig edema, some palm erythema. Neuro: moves all 4 ext grossly. No asterixis. Skin: no pruritis and some palm erythema.

## 2023-08-01 ENCOUNTER — LAB OUTSIDE AN ENCOUNTER (OUTPATIENT)
Dept: URBAN - METROPOLITAN AREA CLINIC 86 | Facility: CLINIC | Age: 68
End: 2023-08-01

## 2023-08-13 ENCOUNTER — TELEPHONE ENCOUNTER (OUTPATIENT)
Dept: URBAN - METROPOLITAN AREA CLINIC 86 | Facility: CLINIC | Age: 68
End: 2023-08-13

## 2023-08-13 NOTE — HPI-TODAY'S VISIT:
Dear Dr Lucas Marte, Aug 12 mri: MRI shows lower thorax with no large effusions. Liver with no significant fat or iron deposition seen.  They do see background chronic liver disease/cirrhosis.  They also did mention that the contrast timing was a little off but no definite suspicious lesions seen. They did also mention that the nearly occlusive thrombus within the main portal vein and marked attenuation of the right and left portal veins was seen. Gallbladder/biliary tree views show gallstones with mild gallbladder wall thickening and edema but no bile duct dilation. Splenomegaly changes seen with Gamma [place] bodies. Pancreas appeared normal. 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 Kidneys, gastrointestinal views, and lymph node views were normal. Vessel veiws showed large varices.   Retroperitoneal views that show also large volume ascites. You need to continue to work on keeping that at bay with low-salt diet and diuretics as needed. Subtle enhancement seen in the right paraspinal region which was nonspecific and you may need to review that with your primary care provider or see someone specifically for that. In summary, chronic liver disease is still seen, with signs of portal hypertension including splenomegaly, varices, and ascites.  The clot is nearly occlusive in the main portal vein and similar to before. No suspicious lesions definitely seen. Subtle area of changes seen in the right paraspinal region in the right posterior rib and transverse process articulations which states that could be degenerative versus traumatic they recommend clinical correlation. Please share with primary providers. I have attached a copy for you to review as well. Dr. Albarran

## 2023-08-18 ENCOUNTER — DASHBOARD ENCOUNTERS (OUTPATIENT)
Age: 68
End: 2023-08-18

## 2023-08-18 ENCOUNTER — WEB ENCOUNTER (OUTPATIENT)
Dept: URBAN - METROPOLITAN AREA CLINIC 86 | Facility: CLINIC | Age: 68
End: 2023-08-18

## 2023-08-18 ENCOUNTER — OFFICE VISIT (OUTPATIENT)
Dept: URBAN - METROPOLITAN AREA CLINIC 86 | Facility: CLINIC | Age: 68
End: 2023-08-18
Payer: MEDICARE

## 2023-08-18 VITALS
HEART RATE: 76 BPM | BODY MASS INDEX: 22.66 KG/M2 | DIASTOLIC BLOOD PRESSURE: 64 MMHG | TEMPERATURE: 96.9 F | HEIGHT: 69 IN | WEIGHT: 153 LBS | SYSTOLIC BLOOD PRESSURE: 106 MMHG

## 2023-08-18 DIAGNOSIS — I85.00 ESOPHAGEAL VARICES WITHOUT BLEEDING: ICD-10-CM

## 2023-08-18 DIAGNOSIS — K75.4 AUTOIMMUNE HEPATITIS: ICD-10-CM

## 2023-08-18 DIAGNOSIS — R18.8 OTHER ASCITES: ICD-10-CM

## 2023-08-18 DIAGNOSIS — Z79.899 HIGH RISK MEDICATION USE: ICD-10-CM

## 2023-08-18 PROCEDURE — 99215 OFFICE O/P EST HI 40 MIN: CPT

## 2023-08-18 RX ORDER — SPIRONOLACTONE 100 MG/1
1 TABLET TABLET, FILM COATED ORAL ONCE A DAY
Qty: 30 | Refills: 3 | Status: ACTIVE | COMMUNITY

## 2023-08-18 RX ORDER — FUROSEMIDE 40 MG/1
1 TABLET TABLET ORAL ONCE A DAY
Qty: 30 | Refills: 3 | Status: ACTIVE | COMMUNITY

## 2023-08-18 RX ORDER — PANTOPRAZOLE SODIUM 40 MG
TAKE 1 TABLET (40 MG) BY ORAL ROUTE ONCE DAILY TABLET, DELAYED RELEASE (ENTERIC COATED) ORAL 1
Qty: 0 | Refills: 0 | Status: ACTIVE | COMMUNITY
Start: 1900-01-01

## 2023-08-18 RX ORDER — URSODIOL 300 MG/1
TAKE 1 CAPSULE BY ORAL ROUTE TID TAKE WITH FOOD CAPSULE ORAL
Qty: 90 | Refills: 1 | Status: DISCONTINUED | COMMUNITY

## 2023-08-18 RX ORDER — INSULIN GLARGINE 100 [IU]/ML
30 UNITS SQ INJECT BY SUBCUTANEOUS ROUTE 1X INJECTION, SOLUTION SUBCUTANEOUS 1
Refills: 0 | Status: ACTIVE | COMMUNITY
Start: 1900-01-01

## 2023-08-18 NOTE — EXAM-PHYSICAL EXAM
Gen: awake and responsive. Mild bitemporal wasting. Looks thinner. Eyes: anicteric, normal lids. Mouth: dry lips. No thrush.  Nose: no drainage. Hearing: intact grossly. Neck: trachea midline and no jvd. CV: RRR no s3. Lungs: clear. No wheezes, Abd: Soft, nabs, nr, NT. Minimal fluid wave. Has umb hernia mildly protuberant. Spleen tip not palpable.  Ext: 1+ edema lower leg, some palm erythema. Neuro: moves all 4 ext grossly. No asterixis. Skin: no pruritis and some palm erythema.

## 2023-08-18 NOTE — HPI-TODAY'S VISIT:
Pt is a 67 year old Other Race male, after a previous visit June 2023 for follow-up evaluation of Autoimmune Hepatitis and phtn complications of ascites and being worked up for oltx and may be listed soon.  Seen at [facility] and he says on transplant list pathway.  He says ascites is a little worse.  Says last ta 3 weeks ago here.  Need to labs.  Aug 12 mri: MRI shows lower thorax with no large effusions. Liver with no significant fat or iron deposition seen.  They do see background chronic liver disease/cirrhosis.  They also did mention that the contrast timing was a little off but no definite suspicious lesions seen. They did also mention that the nearly occlusive thrombus within the main portal vein and marked attenuation of the right and left portal veins was seen. Gallbladder/biliary tree views show gallstones with mild gallbladder wall thickening and edema but no bile duct dilation. Splenomegaly changes seen with Gamma [place] bodies. Pancreas appeared normal. 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 Kidneys, gastrointestinal views, and lymph node views were normal. Vessel veiws showed large varices.   Retroperitoneal views that show also large volume ascites. You need to continue to work on keeping that at bay with low-salt diet and diuretics as needed. Subtle enhancement seen in the right paraspinal region which was nonspecific and you may need to review that with your primary care provider or see someone specifically for that. In summary, chronic liver disease is still seen, with signs of portal hypertension including splenomegaly, varices, and ascites.  The clot is nearly occlusive in the main portal vein and similar to before. No suspicious lesions definitely seen. Subtle area of changes seen in the right paraspinal region in the right posterior rib and transverse process articulations which states that could be degenerative versus traumatic they recommend clinical correlation. Please share with primary providers.  He says that post a tap he had a hematoma that formed and he thinks that is what was seen,  Prior he has not done the tips procedure done and he does not want to do do.   Prior he was  trying to work and he is doing 2 x a week.  He met with IR team to discuss the options on tips but chose to not do this.  Prior visit his last tap was over a month like 6 weeks.  He is low salt diet and it has worked it.  He is taking Lasix 20mg and aldactone 50mg po qd.  He is afraid of tips and its potential to cause pse and he wants to continue to work.  Wife started chemotherapy and dealing with her illness and his illness and his ill. Wife is stage 4,  June 9 2023 chol 162 and hdl 45 and trg 85 and ldl 99 and glu 150 and bun 18 and cr 0.67 and na 132 and k 4.3 and cl 99 and co2 28 and ca 8.1 little low and alb 2.6 and tb 2.7 and alk 88 and ast30 and alt 18  a1c down 5.6%.  inr 1.4 and wbc 5 hg 12.2 and plat 126.  crp 27.9 and psa 0.16. Meld 14 and meld na 18.   April 7 tap: low total protein seen which is typical for cirrhosis driven ascites. Albumin also low as expected.  so not infected. April 7 had 6.7 liter tap. He says he feels better now post tap. He was asked weight had been stable on the diuretics pre tap.   3/24/23 labs were sent to us.  The white blood cells 3.7, hemoglobin 13, mcv 104, platelets low at 86. It does not appear Dr. Robledo office ran the kidney function test.   3/24/23  Patient was recently hospitalized due to GI bleed and underwent antegrade obliteration of varices using Sclerosis and embolization.  He was seen hematology.  He had a colonoscopy and EGD while in the hospital.  He had large bleeding rectal varices and a 5 mm polyp in the sigmoid colon.  He underwent paracentesis by IR with 4 L removed.  He is pending IR evaluation for possible prophylactic treatment of large perigastric collaterals with may be BRTO/CARTI 1. No mention of tips as primary due to risk for pse.   He had a CT scan while in the hospital showing a nonocclusive clot and given the bleed options for anticoagulation were limited.    Also his MELD score is only 16 and so he would not be higher priority for transplant.    He says that in a week of the hospital the ascites.   He is taking the water pills lasix qd and aldactone qd.  Weight to 170.5 and last time  183 and when left hospital 177.  3/17 He had paracentesis last with and 6 L removed.   January 4 MRI back. Lower thorax where seen appeared to be normal. Liver showed no fat or iron.  They do see changes of chronic liver disease with nodular contour, lobar redistribution, and fissural widening.  No suspicious lesions seen. Patent portal vein seen. Nonspecific mild edema seen in the gallbladder likely felt related to your chronic liver disease.  No gallstones seen.  No bile duct dilation. Spleen enlarged up to 17 cm with some [place] bodies seen. Pancreas, adrenal glands, and kidneys appear normal. Possible circumferential wall thickening seen of the ascending colon and nonspecific and may be related due to portal colopathy from the liver disease. Perigastric and perisplenic varices seen.  Probable splenorenal shunt seen.  Portal vein, splenic vein, and SMV vein. They do see moderate ascites still present so we need to keep working on the diuretics for that as we discussed. Overall no suspicious lesions seen. Called pt re the mri report and left brief vmail and send via [employer] portal message.  He is on a low salt diet.  In 2019 he had ascites and had an u/s in nov 2019 that showed trace ascites and cirrhosis, splenomegaly no lesion noted.  December 3, 2021 ultrasound shows the liver to be increased/coarsened echotexture and nodular contour compatible with cirrhosis. There is a 9 x 6 x 10 mm subcapsular hypoechoic focus seen along the medial margin of the liver that they favored to represent trace perihepatic fluid. Previously measured hyperechoic focus in the liver was not confidently seen. No bile duct dilation seen and common bile duct 4.8 mm. Gallbladder unremarkable. Right kidney 10.9 cm and left kidney 10.9 cm. No hydronephrosis. Spleen enlarged at 15.7 cm. Moderate to large volume perihepatic ascites seen. May want to consider MRI to further assess the fluid issues.  January 27 2021 colonoscopy with Dr. Melchor notes a diminutive polyp in the descending colon there was removed with cold forceps a diminutive polyp in the transverse colon that was removed with cold forcep scattered diverticuli seen in the left colon nonbleeding rectal varices and prior hemorrhoid surgery scar was seen.  January 27 2021 EGD showed post variceal banding scar lower third esophagus no appreciable ascites single greater than 50 mm sessile polyps in the gastric antrum and biopsies were taken for histology. A repeat EGD was recommended for 1 year. Gastric biopsy came back inflammatory type polyp with foveolar hyperplasia and granulation tissue. The transverse colon and ascending colon polyps were tubular adenomas.  He was to do april egd with DR Melchor and to do end of august 2022.  Jan 20 mri: Morphologic changes of chronic liver disease and stigmata of portal hypertension with abdominal varices and splenomegaly. No HCC seen. No liver fat or iron. Morphologic changes of chronic liver disease including nodular contour, lobar redistribution, fissural widening, and diffuse background of delayed reticular enhancement compatible with fibrosis. Portal vein is patent. Spleen is enlarged measuring up to 17.8. Pancreas and kidneys normal. Small perigastric and perisplenic varices are present. Small volume perihepatic ascites.  Dec 2020 u.s: cirrhotic appearing liver. Small 5x8x6 mm lesion seen in right lobe and indeterminate. They recommend mri.  Liver patent vessels.  Spleen enlarged. Trace ascites. Spleen 16cm.  Right kidney 11.1cm. Pancreas normal. Common bile duct 3.4mm.   Sept 2020: U.s: they see signs of cirrhosis, with signs of portal hypertension. Small volume ascites and splenomegaly.  No suspicious liver lesions. Patent vessels. Spleen 16cm enlarged.  Right kidney 11.6cm. Pancreas normal. No stones in gallbladder. No liver lesions.  2-4-20 Dr Melchor did egd: post variceal scar in lower esophagus. 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 single 15mm polyp in gastric antrum with adjacent scar from prior incomplete polypectomy. Removed hot snare. two 4-8mm sessile polyps in gastric bdy, Hot snare.  He has not had one since and to do Dec 16 2020 with Dr Melchor and he will do colon also.  His last colonoscopy was in December 2019 there was a fair prep.  He had a few polyps which were removed.    path: Pathologic Diagnosis STOMACH, POLYPS, POLYPECTOMY: HYPERPLASTIC POLYP (FRAGMENTS OF) WITH INFLAMMATORY CHANGE, SUPERFICIAL EROSION, GRANULATION TISSUE FORMATION AND CHANGES SUGGESTIVE OF MUCOSAL PROLAPSE.   ag Electronically Signed by Aramis Silverio MD, PhD, Pathologist Location: [facility]  1-28-20 u.s with hepatic cirrhosis and no focal mass seen and splenomegaly seen.  right kidney 12.3cm.  imaged pancreas portions unremarkable. No gallstones. cbd 3mm.  Liver irregular capsule.   Egd 12/3/19  grade 2 varices were banded. single 20mm sessile polyp resection was incomplete due to polypectomy being technically difficult and complex.   Document Type: US Abdomen Complete Document Date: August 23, 2019 17:33   Reason For Exam Liver dz, HTN  DM   Lower extremity edema  REPORT EXAM: US Abdomen Complete  CLINICAL INDICATION: Liver disorder specified;Liver dz, HTN  DM   Lower extremity edema. DM   HTN  Liver DZ  TECHNIQUE: Axial and longitudinal images were obtained of the upper abdomen using real-time ultrasound. [id number]  COMPARISON: Remote MRI of 11/17/2015  FINDINGS:  Liver: Diffuse cirrhotic changes. No lesions. Portal vein is patent, peak velocity approximately 10 cm/s. Hepatic veins are patent.  Bile Ducts: No dilated intrahepatic biliary radicles. Common bile duct measu2.4 mm.  Gallbladder: Normal. Cheney's sign is negative.  Pancreas: Normal.  Right Kidney: Measures 11.6 cm. Normal echogenicity. No hydronephrosis. No focal lesion  Left Kidney: Measures 10.9 cm. Normal echogenicity. No hydronephrosis. No focal lesion  Spleen: Measures: 16.5 cm. Homogeneous appearance.  Aorta: Not well seen  IVC: Normal proximally, not imaged distally.   Other: Small quantities of scattered simple appearing ascitic fluid.  IMPRESSION:    Redemonstrated cirrhotic changes within the liver. No focal lesion identified. Preserved hepatopedal portal flow. Portal hypertension changes as evidenced by small quantities of ascites and splenomegaly.  Plan: 1. Paracentesis here as needed.  2. Pt will do labs today. 3. Continuing oltx eval process. 4.  Pt understands that he need to be open to options.  Duration of the visit was 55 minutes with 20 minutes of chart prep reviewing [place]  work up info and tests and then 35 minutes of face to face visit reviewing recent records current status and future plans for the patient.

## 2023-08-19 ENCOUNTER — TELEPHONE ENCOUNTER (OUTPATIENT)
Dept: URBAN - METROPOLITAN AREA CLINIC 86 | Facility: CLINIC | Age: 68
End: 2023-08-19

## 2023-08-19 LAB
A/G RATIO: 0.6
ABSOLUTE BASOPHILS: 53
ABSOLUTE EOSINOPHILS: 139
ABSOLUTE LYMPHOCYTES: 365
ABSOLUTE MONOCYTES: 355
ABSOLUTE NEUTROPHILS: 3888
ALBUMIN: 2.7
ALKALINE PHOSPHATASE: 88
ALT (SGPT): 21
AST (SGOT): 36
BASOPHILS: 1.1
BILIRUBIN, TOTAL: 4.2
BUN/CREATININE RATIO: (no result)
BUN: 22
CALCIUM: 8.8
CARBON DIOXIDE, TOTAL: 27
CHLORIDE: 97
CREATININE: 0.81
EGFR: 97
EOSINOPHILS: 2.9
GLOBULIN, TOTAL: 4.8
GLUCOSE: 196
HEMATOCRIT: 35
HEMOGLOBIN: 12.3
INR: 1.3
LYMPHOCYTES: 7.6
MCH: 34.2
MCHC: 35.1
MCV: 97.2
MONOCYTES: 7.4
MPV: 9.4
NEUTROPHILS: 81
PLATELET COUNT: 162
POTASSIUM: 4.4
PROTEIN, TOTAL: 7.5
PT: 13.6
RDW: 13
RED BLOOD CELL COUNT: 3.6
SODIUM: 133
WHITE BLOOD CELL COUNT: 4.8

## 2023-08-19 NOTE — HPI-TODAY'S VISIT:
Dear Dr Lucas Marte, August 18 labs show white blood cell count 4.8 and hemoglobin slightly low at 12.3 and hematocrit low at 35 with platelet count being normal at 162. MCV 97.2 normal.  Your RBC count was low at 3.6 and MCH elevated at 34.2.  These are nonspecific and we need to follow this over time. Neutrophils normal at 3888 and lymphocytes low at 365. Sugar was up to 196 so you need to work on that. BUN was 22 creatinine 0.81 with a sodium diminished at 133 and potassium 4.4.  Chloride was low at 97 and CO2 of 27.  Calcium was 8.8.  Albumin was low at 2.7.  Bilirubin was elevated at 4.2 but not fractionated.  Alk phos was 88 and your AST was 36 and ALT of 21.  As you recall you had stopped taking your ursodiol therapy to unclear if that may be adding to some of this. But if taking it may drop meld and so needs to be discussed with [facility] team. INR was elevated slightly at 1.3. Meld 15 and meld na 18. We will fax these to Dr Sousa to be aware of but please work on the sugar issues. Dr Albarran

## 2023-09-18 ENCOUNTER — TELEPHONE ENCOUNTER (OUTPATIENT)
Dept: URBAN - METROPOLITAN AREA CLINIC 86 | Facility: CLINIC | Age: 68
End: 2023-09-18

## 2023-09-18 NOTE — HPI-TODAY'S VISIT:
received call: from pts son [phone]  pt is undergoing oltx eval at [facility]. Meld 15/18.  The team at [facility] agree that he is sicker than numbers suggest.  Hoping that he can somehow get done soon.

## 2023-10-10 ENCOUNTER — OFFICE VISIT (OUTPATIENT)
Dept: URBAN - METROPOLITAN AREA CLINIC 86 | Facility: CLINIC | Age: 68
End: 2023-10-10

## 2023-10-18 ENCOUNTER — LAB OUTSIDE AN ENCOUNTER (OUTPATIENT)
Dept: URBAN - METROPOLITAN AREA CLINIC 86 | Facility: CLINIC | Age: 68
End: 2023-10-18

## 2023-10-27 ENCOUNTER — OFFICE VISIT (OUTPATIENT)
Dept: URBAN - METROPOLITAN AREA TELEHEALTH 2 | Facility: TELEHEALTH | Age: 68
End: 2023-10-27

## 2023-10-27 ENCOUNTER — OFFICE VISIT (OUTPATIENT)
Dept: URBAN - METROPOLITAN AREA CLINIC 86 | Facility: CLINIC | Age: 68
End: 2023-10-27

## 2023-10-27 RX ORDER — SPIRONOLACTONE 100 MG/1
1 TABLET TABLET, FILM COATED ORAL ONCE A DAY
Qty: 30 | Refills: 3 | Status: ACTIVE | COMMUNITY

## 2023-10-27 RX ORDER — PANTOPRAZOLE SODIUM 40 MG
TAKE 1 TABLET (40 MG) BY ORAL ROUTE ONCE DAILY TABLET, DELAYED RELEASE (ENTERIC COATED) ORAL 1
Qty: 0 | Refills: 0 | Status: ACTIVE | COMMUNITY
Start: 1900-01-01

## 2023-10-27 RX ORDER — FUROSEMIDE 40 MG/1
1 TABLET TABLET ORAL ONCE A DAY
Qty: 30 | Refills: 3 | Status: ACTIVE | COMMUNITY

## 2023-10-27 RX ORDER — INSULIN GLARGINE 100 [IU]/ML
30 UNITS SQ INJECT BY SUBCUTANEOUS ROUTE 1X INJECTION, SOLUTION SUBCUTANEOUS 1
Refills: 0 | Status: ACTIVE | COMMUNITY
Start: 1900-01-01

## 2023-10-27 NOTE — HPI-TODAY'S VISIT:
Pt is a 68 year old Other Race male, after a previous visit Aug 2023 for follow-up evaluation of Autoimmune Hepatitis and phtn complications of ascites and being worked up for oltx and may be listed soon.  Seen at [facility] and he says on transplant list pathway.  He says ascites is a little worse.  Says last ta 3 weeks ago here.  Dear Dr Lucas Marte, August 18 labs show white blood cell count 4.8 and hemoglobin slightly low at 12.3 and hematocrit low at 35 with platelet count being normal at 162. MCV 97.2 normal. Your RBC count was low at 3.6 and MCH elevated at 34.2. These are nonspecific and we need to follow this over time. Neutrophils normal at 3888 and lymphocytes low at 365. Sugar was up to 196 so you need to work on that. BUN was 22 creatinine 0.81 with a sodium diminished at 133 and potassium 4.4. Chloride was low at 97 and CO2 of 27. Calcium was 8.8. Albumin was low at 2.7. Bilirubin was elevated at 4.2 but not fractionated. Alk phos was 88 and your AST was 36 and ALT of 21. As you recall you had stopped taking your ursodiol therapy to unclear if that may be adding to some of this. But if taking it may drop meld and so needs to be discussed with [facility] team. INR was elevated slightly at 1.3. Meld 15 and meld na 18. We will fax these to Dr Sousa to be aware of but please work on the sugar issues. Dr Albarran  Aug 12 mri: MRI shows lower thorax with no large effusions. Liver with no significant fat or iron deposition seen.  They do see background chronic liver disease/cirrhosis.  They also did mention that the contrast timing was a little off but no definite suspicious lesions seen. They did also mention that the nearly occlusive thrombus within the main portal vein and marked attenuation of the right and left portal veins was seen. Gallbladder/biliary tree views show gallstones with mild gallbladder wall thickening and edema but no bile duct dilation. Splenomegaly changes seen with Gamma Lehighton bodies. Pancreas appeared normal. 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 Kidneys, gastrointestinal views, and lymph node views were normal. Vessel veiws showed large varices.   Retroperitoneal views that show also large volume ascites. You need to continue to work on keeping that at bay with low-salt diet and diuretics as needed. Subtle enhancement seen in the right paraspinal region which was nonspecific and you may need to review that with your primary care provider or see someone specifically for that. In summary, chronic liver disease is still seen, with signs of portal hypertension including splenomegaly, varices, and ascites.  The clot is nearly occlusive in the main portal vein and similar to before. No suspicious lesions definitely seen. Subtle area of changes seen in the right paraspinal region in the right posterior rib and transverse process articulations which states that could be degenerative versus traumatic they recommend clinical correlation. Please share with primary providers.  He says that post a tap he had a hematoma that formed and he thinks that is what was seen,  Prior he has not done the tips procedure done and he does not want to do do.   Prior he was  trying to work and he is doing 2 x a week.  He met with IR team to discuss the options on tips but chose to not do this.  Prior visit his last tap was over a month like 6 weeks.  He is low salt diet and it has worked it.  He is taking Lasix 20mg and aldactone 50mg po qd.  He is afraid of tips and its potential to cause pse and he wants to continue to work.  Wife started chemotherapy and dealing with her illness and his illness and his ill. Wife is stage 4,  June 9 2023 chol 162 and hdl 45 and trg 85 and ldl 99 and glu 150 and bun 18 and cr 0.67 and na 132 and k 4.3 and cl 99 and co2 28 and ca 8.1 little low and alb 2.6 and tb 2.7 and alk 88 and ast30 and alt 18  a1c down 5.6%.  inr 1.4 and wbc 5 hg 12.2 and plat 126.  crp 27.9 and psa 0.16. Meld 14 and meld na 18.   April 7 tap: low total protein seen which is typical for cirrhosis driven ascites. Albumin also low as expected.  so not infected. April 7 had 6.7 liter tap. He says he feels better now post tap. He was asked weight had been stable on the diuretics pre tap.   3/24/23 labs were sent to us.  The white blood cells 3.7, hemoglobin 13, mcv 104, platelets low at 86. It does not appear Dr. Robledo office ran the kidney function test.   3/24/23  Patient was recently hospitalized due to GI bleed and underwent antegrade obliteration of varices using Sclerosis and embolization.  He was seen hematology.  He had a colonoscopy and EGD while in the hospital.  He had large bleeding rectal varices and a 5 mm polyp in the sigmoid colon.  He underwent paracentesis by IR with 4 L removed.  He is pending IR evaluation for possible prophylactic treatment of large perigastric collaterals with may be BRTO/CARTI 1. No mention of tips as primary due to risk for pse.   He had a CT scan while in the hospital showing a nonocclusive clot and given the bleed options for anticoagulation were limited.    Also his MELD score is only 16 and so he would not be higher priority for transplant.    He says that in a week of the hospital the ascites.   He is taking the water pills lasix qd and aldactone qd.  Weight to 170.5 and last time  183 and when left hospital 177.  3/17 He had paracentesis last with and 6 L removed.   January 4 MRI back. Lower thorax where seen appeared to be normal. Liver showed no fat or iron.  They do see changes of chronic liver disease with nodular contour, lobar redistribution, and fissural widening.  No suspicious lesions seen. Patent portal vein seen. Nonspecific mild edema seen in the gallbladder likely felt related to your chronic liver disease.  No gallstones seen.  No bile duct dilation. Spleen enlarged up to 17 cm with some Gamna-Patricia bodies seen. Pancreas, adrenal glands, and kidneys appear normal. Possible circumferential wall thickening seen of the ascending colon and nonspecific and may be related due to portal colopathy from the liver disease. Perigastric and perisplenic varices seen.  Probable splenorenal shunt seen.  Portal vein, splenic vein, and SMV vein. They do see moderate ascites still present so we need to keep working on the diuretics for that as we discussed. Overall no suspicious lesions seen. Called pt re the mri report and left brief vmail and send via [employer] portal message.  He is on a low salt diet.  In 2019 he had ascites and had an u/s in nov 2019 that showed trace ascites and cirrhosis, splenomegaly no lesion noted.  December 3, 2021 ultrasound shows the liver to be increased/coarsened echotexture and nodular contour compatible with cirrhosis. There is a 9 x 6 x 10 mm subcapsular hypoechoic focus seen along the medial margin of the liver that they favored to represent trace perihepatic fluid. Previously measured hyperechoic focus in the liver was not confidently seen. No bile duct dilation seen and common bile duct 4.8 mm. Gallbladder unremarkable. Right kidney 10.9 cm and left kidney 10.9 cm. No hydronephrosis. Spleen enlarged at 15.7 cm. Moderate to large volume perihepatic ascites seen. May want to consider MRI to further assess the fluid issues.  January 27 2021 colonoscopy with Dr. Melchor notes a diminutive polyp in the descending colon there was removed with cold forceps a diminutive polyp in the transverse colon that was removed with cold forcep scattered diverticuli seen in the left colon nonbleeding rectal varices and prior hemorrhoid surgery scar was seen.  January 27 2021 EGD showed post variceal banding scar lower third esophagus no appreciable ascites single greater than 50 mm sessile polyps in the gastric antrum and biopsies were taken for histology. A repeat EGD was recommended for 1 year. Gastric biopsy came back inflammatory type polyp with foveolar hyperplasia and granulation tissue. The transverse colon and ascending colon polyps were tubular adenomas.  He was to do april egd with DR Melchor and to do end of august 2022.  Jan 20 mri: Morphologic changes of chronic liver disease and stigmata of portal hypertension with abdominal varices and splenomegaly. No HCC seen. No liver fat or iron. Morphologic changes of chronic liver disease including nodular contour, lobar redistribution, fissural widening, and diffuse background of delayed reticular enhancement compatible with fibrosis. Portal vein is patent. Spleen is enlarged measuring up to 17.8. Pancreas and kidneys normal. Small perigastric and perisplenic varices are present. Small volume perihepatic ascites.  Dec 2020 u.s: cirrhotic appearing liver. Small 5x8x6 mm lesion seen in right lobe and indeterminate. They recommend mri.  Liver patent vessels.  Spleen enlarged. Trace ascites. Spleen 16cm.  Right kidney 11.1cm. Pancreas normal. Common bile duct 3.4mm.   Sept 2020: U.s: they see signs of cirrhosis, with signs of portal hypertension. Small volume ascites and splenomegaly.  No suspicious liver lesions. Patent vessels. Spleen 16cm enlarged.  Right kidney 11.6cm. Pancreas normal. No stones in gallbladder. No liver lesions.  2-4-20 Dr Melchor did egd: post variceal scar in lower esophagus. 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 single 15mm polyp in gastric antrum with adjacent scar from prior incomplete polypectomy. Removed hot snare. two 4-8mm sessile polyps in gastric bdy, Hot snare.  He has not had one since and to do Dec 16 2020 with Dr Melchor and he will do colon also.  His last colonoscopy was in December 2019 there was a fair prep.  He had a few polyps which were removed.    path: Pathologic Diagnosis STOMACH, POLYPS, POLYPECTOMY: HYPERPLASTIC POLYP (FRAGMENTS OF) WITH INFLAMMATORY CHANGE, SUPERFICIAL EROSION, GRANULATION TISSUE FORMATION AND CHANGES SUGGESTIVE OF MUCOSAL PROLAPSE.   ag Electronically Signed by Aramis Silverio MD, PhD, Pathologist Location: [facility]  1-28-20 u.s with hepatic cirrhosis and no focal mass seen and splenomegaly seen.  right kidney 12.3cm.  imaged pancreas portions unremarkable. No gallstones. cbd 3mm.  Liver irregular capsule.   Egd 12/3/19  grade 2 varices were banded. single 20mm sessile polyp resection was incomplete due to polypectomy being technically difficult and complex.   Document Type: US Abdomen Complete Document Date: August 23, 2019 17:33   Reason For Exam Liver dz, HTN  DM   Lower extremity edema  REPORT EXAM: US Abdomen Complete  CLINICAL INDICATION: Liver disorder specified;Liver dz, HTN  DM   Lower extremity edema. DM   HTN  Liver DZ  TECHNIQUE: Axial and longitudinal images were obtained of the upper abdomen using real-time ultrasound. [id number]  COMPARISON: Remote MRI of 11/17/2015  FINDINGS:  Liver: Diffuse cirrhotic changes. No lesions. Portal vein is patent, peak velocity approximately 10 cm/s. Hepatic veins are patent.  Bile Ducts: No dilated intrahepatic biliary radicles. Common bile duct measu2.4 mm.  Gallbladder: Normal. Cheney's sign is negative.  Pancreas: Normal.  Right Kidney: Measures 11.6 cm. Normal echogenicity. No hydronephrosis. No focal lesion  Left Kidney: Measures 10.9 cm. Normal echogenicity. No hydronephrosis. No focal lesion  Spleen: Measures: 16.5 cm. Homogeneous appearance.  Aorta: Not well seen  IVC: Normal proximally, not imaged distally.   Other: Small quantities of scattered simple appearing ascitic fluid.  IMPRESSION:    Redemonstrated cirrhotic changes within the liver. No focal lesion identified. Preserved hepatopedal portal flow. Portal hypertension changes as evidenced by small quantities of ascites and splenomegaly.  Plan: 1. Paracentesis here as needed.  2. Pt will do labs today. 3. Continuing oltx eval process. 4.  Pt understands that he need to be open to options.  Duration of the visit was  minutes with 10 minutes of chart prep reviewing [place]  work up info and tests and then 35 minutes of face to face visit reviewing recent records current status and future plans for the patient.

## 2023-11-02 ENCOUNTER — TELEPHONE ENCOUNTER (OUTPATIENT)
Dept: URBAN - METROPOLITAN AREA CLINIC 86 | Facility: CLINIC | Age: 68
End: 2023-11-02

## 2023-11-02 NOTE — HPI-TODAY'S VISIT:
Spoke with teresa the son of Dr Marte. Father was called in but did not get the offer as was back up, had pse and went first to [facility] and then to [place] and there few days. Now home. Son wanted us to know and to start MARLENI for pse, Follow course.